# Patient Record
Sex: MALE | Race: WHITE | Employment: OTHER | URBAN - METROPOLITAN AREA
[De-identification: names, ages, dates, MRNs, and addresses within clinical notes are randomized per-mention and may not be internally consistent; named-entity substitution may affect disease eponyms.]

---

## 2017-03-21 ENCOUNTER — APPOINTMENT (OUTPATIENT)
Dept: LAB | Age: 42
End: 2017-03-21
Attending: PHYSICIAN ASSISTANT
Payer: COMMERCIAL

## 2017-03-24 ENCOUNTER — LAB ENCOUNTER (OUTPATIENT)
Dept: LAB | Age: 42
End: 2017-03-24
Attending: PHYSICIAN ASSISTANT
Payer: COMMERCIAL

## 2017-03-24 ENCOUNTER — PRIOR ORIGINAL RECORDS (OUTPATIENT)
Dept: OTHER | Age: 42
End: 2017-03-24

## 2017-03-24 ENCOUNTER — OFFICE VISIT (OUTPATIENT)
Dept: FAMILY MEDICINE CLINIC | Facility: CLINIC | Age: 42
End: 2017-03-24

## 2017-03-24 VITALS
DIASTOLIC BLOOD PRESSURE: 78 MMHG | TEMPERATURE: 98 F | BODY MASS INDEX: 28.35 KG/M2 | HEART RATE: 74 BPM | HEIGHT: 70 IN | WEIGHT: 198 LBS | RESPIRATION RATE: 16 BRPM | SYSTOLIC BLOOD PRESSURE: 120 MMHG

## 2017-03-24 DIAGNOSIS — E78.00 HYPERCHOLESTEREMIA: ICD-10-CM

## 2017-03-24 DIAGNOSIS — E04.1 THYROID NODULE: ICD-10-CM

## 2017-03-24 DIAGNOSIS — Z01.89 ROUTINE LAB DRAW: ICD-10-CM

## 2017-03-24 DIAGNOSIS — E78.00 HYPERCHOLESTEREMIA: Primary | ICD-10-CM

## 2017-03-24 LAB
ALBUMIN SERPL-MCNC: 4 G/DL (ref 3.5–4.8)
ALP LIVER SERPL-CCNC: 64 U/L (ref 45–117)
ALT SERPL-CCNC: 50 U/L (ref 17–63)
AST SERPL-CCNC: 26 U/L (ref 15–41)
BASOPHILS # BLD AUTO: 0.04 X10(3) UL (ref 0–0.1)
BASOPHILS NFR BLD AUTO: 0.6 %
BILIRUB SERPL-MCNC: 0.4 MG/DL (ref 0.1–2)
BILIRUB UR QL STRIP.AUTO: NEGATIVE
BUN BLD-MCNC: 18 MG/DL (ref 8–20)
CALCIUM BLD-MCNC: 9.2 MG/DL (ref 8.3–10.3)
CHLORIDE: 106 MMOL/L (ref 101–111)
CHOLEST SMN-MCNC: 128 MG/DL (ref ?–200)
CLARITY UR REFRACT.AUTO: CLEAR
CO2: 29 MMOL/L (ref 22–32)
COLOR UR AUTO: YELLOW
CREAT BLD-MCNC: 0.97 MG/DL (ref 0.7–1.3)
EOSINOPHIL # BLD AUTO: 0.15 X10(3) UL (ref 0–0.3)
EOSINOPHIL NFR BLD AUTO: 2.4 %
ERYTHROCYTE [DISTWIDTH] IN BLOOD BY AUTOMATED COUNT: 12.9 % (ref 11.5–16)
FREE T4: 0.8 NG/DL (ref 0.9–1.8)
GLUCOSE BLD-MCNC: 78 MG/DL (ref 70–99)
GLUCOSE UR STRIP.AUTO-MCNC: NEGATIVE MG/DL
HCT VFR BLD AUTO: 44.3 % (ref 37–53)
HDLC SERPL-MCNC: 53 MG/DL (ref 45–?)
HDLC SERPL: 2.42 {RATIO} (ref ?–4.97)
HGB BLD-MCNC: 13.9 G/DL (ref 13–17)
IMMATURE GRANULOCYTE COUNT: 0.01 X10(3) UL (ref 0–1)
IMMATURE GRANULOCYTE RATIO %: 0.2 %
KETONES UR STRIP.AUTO-MCNC: NEGATIVE MG/DL
LDLC SERPL CALC-MCNC: 52 MG/DL (ref ?–130)
LEUKOCYTE ESTERASE UR QL STRIP.AUTO: NEGATIVE
LYMPHOCYTES # BLD AUTO: 1.92 X10(3) UL (ref 0.9–4)
LYMPHOCYTES NFR BLD AUTO: 30.5 %
M PROTEIN MFR SERPL ELPH: 7.3 G/DL (ref 6.1–8.3)
MCH RBC QN AUTO: 27.6 PG (ref 27–33.2)
MCHC RBC AUTO-ENTMCNC: 31.4 G/DL (ref 31–37)
MCV RBC AUTO: 88.1 FL (ref 80–99)
MONOCYTES # BLD AUTO: 0.67 X10(3) UL (ref 0.1–0.6)
MONOCYTES NFR BLD AUTO: 10.7 %
NEUTROPHIL ABS PRELIM: 3.5 X10 (3) UL (ref 1.3–6.7)
NEUTROPHILS # BLD AUTO: 3.5 X10(3) UL (ref 1.3–6.7)
NEUTROPHILS NFR BLD AUTO: 55.6 %
NITRITE UR QL STRIP.AUTO: NEGATIVE
NONHDLC SERPL-MCNC: 75 MG/DL (ref ?–130)
PH UR STRIP.AUTO: 7 [PH] (ref 4.5–8)
PLATELET # BLD AUTO: 195 10(3)UL (ref 150–450)
POTASSIUM SERPL-SCNC: 4.1 MMOL/L (ref 3.6–5.1)
PROT UR STRIP.AUTO-MCNC: NEGATIVE MG/DL
RBC # BLD AUTO: 5.03 X10(6)UL (ref 4.3–5.7)
RBC UR QL AUTO: NEGATIVE
RED CELL DISTRIBUTION WIDTH-SD: 41.3 FL (ref 35.1–46.3)
SODIUM SERPL-SCNC: 142 MMOL/L (ref 136–144)
SP GR UR STRIP.AUTO: 1.02 (ref 1–1.03)
TRIGLYCERIDES: 114 MG/DL (ref ?–150)
TSI SER-ACNC: 0.87 MIU/ML (ref 0.35–5.5)
UROBILINOGEN UR STRIP.AUTO-MCNC: <2 MG/DL
VLDL: 23 MG/DL (ref 5–40)
WBC # BLD AUTO: 6.3 X10(3) UL (ref 4–13)

## 2017-03-24 PROCEDURE — 81003 URINALYSIS AUTO W/O SCOPE: CPT

## 2017-03-24 PROCEDURE — 84439 ASSAY OF FREE THYROXINE: CPT

## 2017-03-24 PROCEDURE — 36415 COLL VENOUS BLD VENIPUNCTURE: CPT

## 2017-03-24 PROCEDURE — 80061 LIPID PANEL: CPT

## 2017-03-24 PROCEDURE — 80053 COMPREHEN METABOLIC PANEL: CPT

## 2017-03-24 PROCEDURE — 85025 COMPLETE CBC W/AUTO DIFF WBC: CPT

## 2017-03-24 PROCEDURE — 99396 PREV VISIT EST AGE 40-64: CPT | Performed by: PHYSICIAN ASSISTANT

## 2017-03-24 PROCEDURE — 84443 ASSAY THYROID STIM HORMONE: CPT

## 2017-03-24 RX ORDER — ROSUVASTATIN CALCIUM 10 MG/1
TABLET, COATED ORAL
Qty: 30 TABLET | Refills: 11 | Status: SHIPPED | OUTPATIENT
Start: 2017-03-24 | End: 2018-04-10

## 2017-03-24 NOTE — PROGRESS NOTES
REASON FOR VISIT:    Lynnette Duran is a 42year old male who presents for an [address].     HLD  Pt is doing well on crestor  Pt denies any SOB/CP, muscle aches    Thyroid nodule  Monitoring with annual US    No other complaints or problems 0.99 10/01/2015   CREATSERUM 0.88 09/04/2015     No results found for: PSA    General Health     How would you describe your current health state?: Fair    Type of Diet: Balanced    How do you maintain positive mental well-being?: Visiting Family    How wo adults age 22-65, older adults at increased risk No results found for: HIV    Syphilis Screening Screen if pregnant or high risk No results found for: RPR    Hepatitis C Screening Screen those at high risk plus screen one time for adults born 1946-1 [address] [facility]. Abdominal pain    • Diarrhea    • Fatigue    • Low back sprain    • URI (upper respiratory infection)    • Other and unspecified hyperlipidemia           Past Surgical History    ORAL SURGERY PROCEDURE      Comment wisdom teeth      Family History   Proble PLAN SUMMARY:   Diagnoses and all orders for this visit:  PHQ-2 score 0  CAGE score 0  Pt is up to date on routine screening and health maintenance    Hypercholesteremia  Chronic controlled on crestor  Will continue and check labs  -     Lipid Panel; F

## 2017-03-24 NOTE — PATIENT INSTRUCTIONS
Thank you for choosing Madelyn Dillon PA-C at [place] [address]  To Do: Shilo Naidu  1. Pt to continue medications  2. Get lab work done  3. Order for thyroid US  4.  Follow-up in 1 year, sooner if problems    • Please signup for MY CHART, which i make you healthier and to improve your quality of life.     Referrals, and Radiology Information:    If your insurance requires a referral to a specialist, please allow 5 business days to process your referral request.    If Sary Britt PA-C orders a

## 2017-03-31 ENCOUNTER — HOSPITAL ENCOUNTER (OUTPATIENT)
Dept: ULTRASOUND IMAGING | Age: 42
Discharge: HOME | End: 2017-03-31
Attending: PHYSICIAN ASSISTANT
Payer: COMMERCIAL

## 2017-03-31 DIAGNOSIS — E04.1 THYROID NODULE: ICD-10-CM

## 2017-03-31 PROCEDURE — 76536 US EXAM OF HEAD AND NECK: CPT

## 2017-04-13 ENCOUNTER — OFFICE VISIT (OUTPATIENT)
Dept: FAMILY MEDICINE CLINIC | Facility: CLINIC | Age: 42
End: 2017-04-13

## 2017-04-13 VITALS
WEIGHT: 199 LBS | TEMPERATURE: 98 F | SYSTOLIC BLOOD PRESSURE: 110 MMHG | BODY MASS INDEX: 28.49 KG/M2 | DIASTOLIC BLOOD PRESSURE: 78 MMHG | HEART RATE: 64 BPM | HEIGHT: 70 IN

## 2017-04-13 DIAGNOSIS — H65.01 RIGHT ACUTE SEROUS OTITIS MEDIA, RECURRENCE NOT SPECIFIED: ICD-10-CM

## 2017-04-13 DIAGNOSIS — F41.9 ANXIETY: ICD-10-CM

## 2017-04-13 DIAGNOSIS — R59.9 ENLARGED LYMPH NODES: Primary | ICD-10-CM

## 2017-04-13 DIAGNOSIS — R14.0 ABDOMINAL BLOATING: ICD-10-CM

## 2017-04-13 PROCEDURE — 99214 OFFICE O/P EST MOD 30 MIN: CPT | Performed by: PHYSICIAN ASSISTANT

## 2017-04-13 RX ORDER — IPRATROPIUM BROMIDE 21 UG/1
2 SPRAY, METERED NASAL EVERY 12 HOURS
Qty: 1 BOTTLE | Refills: 0 | Status: SHIPPED | OUTPATIENT
Start: 2017-04-13 | End: 2017-05-11

## 2017-04-13 NOTE — PROGRESS NOTES
[facility] Internal Medicine Progress Note    CC:  Patient presents with:  Test Results  Throat Problem: right side lump/sore. No pain when swallowing, unknown if it hurts as he keeps pressing on his throat. Ear Pain: right inner ear/fluid? Gastrointestinal: Positive for abdominal pain and abdominal distention. Negative for nausea, vomiting, diarrhea, constipation, blood in stool, anal bleeding and rectal pain. Hematological: Positive for adenopathy.    Psychiatric/Behavioral: Negative for month, sooner if problems       Orders Placed This Encounter  Celiac Disease Panel [E]  Amylase [E]  Lipase [E]    Meds & Refills for this Visit:  Signed Prescriptions Disp Refills    Ipratropium Bromide (ATROVENT) 0.03 % Nasal Solution 1 Bottle 0      Sig

## 2017-04-13 NOTE — PATIENT INSTRUCTIONS
Thank you for choosing Yesenia Posey PA-C at [place] [address]  To Do: Radha Naidu  1. Pt to get lab work done and imaging  2. Referral for Rafael Hernandez  3.  Pt to follow-up in 1 month, sooner if problems    • Please signup for MY CHART, which is electro healthier and to improve your quality of life.     Referrals, and Radiology Information:    If your insurance requires a referral to a specialist, please allow 5 business days to process your referral request.    If Lucy Teran PA-C orders a CT or MRI

## 2017-04-17 ENCOUNTER — LAB ENCOUNTER (OUTPATIENT)
Dept: LAB | Age: 42
End: 2017-04-17
Attending: PHYSICIAN ASSISTANT
Payer: COMMERCIAL

## 2017-04-17 DIAGNOSIS — R14.0 ABDOMINAL BLOATING: ICD-10-CM

## 2017-04-17 PROCEDURE — 86255 FLUORESCENT ANTIBODY SCREEN: CPT

## 2017-04-17 PROCEDURE — 36415 COLL VENOUS BLD VENIPUNCTURE: CPT

## 2017-04-17 PROCEDURE — 83690 ASSAY OF LIPASE: CPT

## 2017-04-17 PROCEDURE — 83516 IMMUNOASSAY NONANTIBODY: CPT

## 2017-04-17 PROCEDURE — 82784 ASSAY IGA/IGD/IGG/IGM EACH: CPT

## 2017-04-17 PROCEDURE — 82150 ASSAY OF AMYLASE: CPT

## 2017-04-18 ENCOUNTER — HOSPITAL ENCOUNTER (OUTPATIENT)
Dept: CT IMAGING | Age: 42
Discharge: HOME | End: 2017-04-18
Attending: PHYSICIAN ASSISTANT
Payer: COMMERCIAL

## 2017-04-18 ENCOUNTER — HOSPITAL ENCOUNTER (OUTPATIENT)
Dept: ULTRASOUND IMAGING | Age: 42
Discharge: HOME | End: 2017-04-18
Attending: PHYSICIAN ASSISTANT
Payer: COMMERCIAL

## 2017-04-18 DIAGNOSIS — R14.0 ABDOMINAL BLOATING: ICD-10-CM

## 2017-04-18 DIAGNOSIS — R59.9 ENLARGED LYMPH NODES: ICD-10-CM

## 2017-04-18 PROCEDURE — 70491 CT SOFT TISSUE NECK W/DYE: CPT

## 2017-04-18 PROCEDURE — 76700 US EXAM ABDOM COMPLETE: CPT

## 2017-04-24 ENCOUNTER — PRIOR ORIGINAL RECORDS (OUTPATIENT)
Dept: OTHER | Age: 42
End: 2017-04-24

## 2017-04-24 DIAGNOSIS — R14.0 ABDOMINAL BLOATING: Primary | ICD-10-CM

## 2017-04-24 NOTE — PROGRESS NOTES
Quick Note:    All labs are normal  Pt should follow-up with GI, referral for Dr. Ciara Mendoza  ______

## 2017-04-26 LAB
ALBUMIN: 4 G/DL
ALKALINE PHOSPHATATE(ALK PHOS): 64 IU/L
BILIRUBIN TOTAL: 0.4 MG/DL
BUN: 18 MG/DL
CALCIUM: 9.2 MG/DL
CHLORIDE: 106 MEQ/L
CHOLESTEROL, TOTAL: 128 MG/DL
CREATININE, SERUM: 0.97 MG/DL
FREE T4: 0.8 MG/DL
GLUCOSE: 78 MG/DL
HDL CHOLESTEROL: 53 MG/DL
HEMATOCRIT: 44.3 %
HEMOGLOBIN: 13.9 G/DL
LDL CHOLESTEROL: 52 MG/DL
PLATELETS: 195 K/UL
POTASSIUM, SERUM: 4.1 MEQ/L
PROTEIN, TOTAL: 7.3 G/DL
RED BLOOD COUNT: 5.03 X 10-6/U
SGOT (AST): 26 IU/L
SGPT (ALT): 50 IU/L
SODIUM: 142 MEQ/L
THYROID STIMULATING HORMONE: 0.87 MLU/L
TRIGLYCERIDES: 114 MG/DL
WHITE BLOOD COUNT: 6.3 X 10-3/U

## 2017-05-11 NOTE — TELEPHONE ENCOUNTER
Requesting Ipratropium Nasal Spray  LOV: 4/13/17  RTC: one month   Last Labs: n/a  Filled: 4/13/17 #1 bottle with 0 refills    No future appointments.     Non protocol med pended for approval

## 2017-05-15 RX ORDER — IPRATROPIUM BROMIDE 21 UG/1
SPRAY, METERED NASAL
Qty: 1 BOTTLE | Refills: 2 | Status: SHIPPED | OUTPATIENT
Start: 2017-05-15 | End: 2017-08-21 | Stop reason: ALTCHOICE

## 2017-08-21 ENCOUNTER — OFFICE VISIT (OUTPATIENT)
Dept: FAMILY MEDICINE CLINIC | Facility: CLINIC | Age: 42
End: 2017-08-21

## 2017-08-21 VITALS
WEIGHT: 199 LBS | OXYGEN SATURATION: 100 % | HEIGHT: 70 IN | RESPIRATION RATE: 20 BRPM | HEART RATE: 87 BPM | BODY MASS INDEX: 28.49 KG/M2 | TEMPERATURE: 98 F | SYSTOLIC BLOOD PRESSURE: 124 MMHG | DIASTOLIC BLOOD PRESSURE: 70 MMHG

## 2017-08-21 DIAGNOSIS — R30.0 BURNING WITH URINATION: Primary | ICD-10-CM

## 2017-08-21 LAB
APPEARANCE: CLEAR
BILIRUBIN: NEGATIVE
GLUCOSE (URINE DIPSTICK): NEGATIVE MG/DL
KETONES (URINE DIPSTICK): NEGATIVE MG/DL
LEUKOCYTES: NEGATIVE
MULTISTIX LOT#: NORMAL NUMERIC
NITRITE, URINE: NEGATIVE
OCCULT BLOOD: NEGATIVE
PH, URINE: 6 (ref 4.5–8)
PROTEIN (URINE DIPSTICK): NEGATIVE MG/DL
SPECIFIC GRAVITY: 1.02 (ref 1–1.03)
URINE-COLOR: YELLOW
UROBILINOGEN,SEMI-QN: 1 MG/DL (ref 0–1.9)

## 2017-08-21 PROCEDURE — 87491 CHLMYD TRACH DNA AMP PROBE: CPT | Performed by: NURSE PRACTITIONER

## 2017-08-21 PROCEDURE — 87086 URINE CULTURE/COLONY COUNT: CPT | Performed by: NURSE PRACTITIONER

## 2017-08-21 PROCEDURE — 87591 N.GONORRHOEAE DNA AMP PROB: CPT | Performed by: NURSE PRACTITIONER

## 2017-08-21 PROCEDURE — 99213 OFFICE O/P EST LOW 20 MIN: CPT | Performed by: NURSE PRACTITIONER

## 2017-08-21 PROCEDURE — 81003 URINALYSIS AUTO W/O SCOPE: CPT | Performed by: NURSE PRACTITIONER

## 2017-08-21 RX ORDER — CIPROFLOXACIN 500 MG/1
500 TABLET, FILM COATED ORAL 2 TIMES DAILY
Qty: 28 TABLET | Refills: 0 | Status: SHIPPED | OUTPATIENT
Start: 2017-08-21 | End: 2017-09-04

## 2017-08-21 NOTE — PATIENT INSTRUCTIONS
Anatomy of the Male Urinary Tract  Your urinary tract helps to get rid of your body’s liquid waste. The kidneys constantly filter the blood to collect unneeded chemicals and water, making urine. Urine travels through the ureters to the bladder.  The bladd · Prostatitis: This is an inflammation or infection of the prostate. You may have an urgent or frequent need to urinate, fever, or burning when you urinate. Or you may have a tender prostate, or a vague feeling of pressure.  Prostatitis is treated with a ra © 9438-0452 [address], [address] [place]. All rights reserved. This information is not intended as a substitute for professional medical care. Always follow your healthcare professional's instructions.

## 2017-08-21 NOTE — PROGRESS NOTES
CHIEF COMPLAINT:   Patient presents with:  Burning On Urination: 1 week on/off      HPI:   Neri Morrison is a 42year old male who presents with symptoms of UTI. Complaining of dysuria for last 7-10 days.  Symptoms have been intermittent since onset, oc GENERAL: well developed, well nourished,in no apparent distress  CARDIO: RRR, no murmurs  LUNGS: clear to ausculation bilaterally, no wheezing or rhonchi  GI: BS present x 4. No hepatosplenomegaly. : no suprapubic tenderness.  No bladder distention, or Your urinary tract helps to get rid of your body’s liquid waste. The kidneys constantly filter the blood to collect unneeded chemicals and water, making urine. Urine travels through the ureters to the bladder.  The bladder fills with urine, holding it until · Prostatitis: This is an inflammation or infection of the prostate. You may have an urgent or frequent need to urinate, fever, or burning when you urinate. Or you may have a tender prostate, or a vague feeling of pressure.  Prostatitis is treated with a ra © 6981-6787 [address], [address] [place]. All rights reserved. This information is not intended as a substitute for professional medical care. Always follow your healthcare professional's instructions.               Kermit Goldberg

## 2017-08-22 LAB
C TRACH DNA SPEC QL NAA+PROBE: NEGATIVE
N GONORRHOEA DNA SPEC QL NAA+PROBE: NEGATIVE

## 2017-11-10 ENCOUNTER — OFFICE VISIT (OUTPATIENT)
Dept: FAMILY MEDICINE CLINIC | Facility: CLINIC | Age: 42
End: 2017-11-10

## 2017-11-10 VITALS
RESPIRATION RATE: 16 BRPM | HEART RATE: 88 BPM | TEMPERATURE: 98 F | HEIGHT: 70 IN | DIASTOLIC BLOOD PRESSURE: 82 MMHG | SYSTOLIC BLOOD PRESSURE: 124 MMHG | BODY MASS INDEX: 28.35 KG/M2 | WEIGHT: 198 LBS

## 2017-11-10 DIAGNOSIS — J01.00 ACUTE MAXILLARY SINUSITIS, RECURRENCE NOT SPECIFIED: Primary | ICD-10-CM

## 2017-11-10 PROCEDURE — 99213 OFFICE O/P EST LOW 20 MIN: CPT | Performed by: PHYSICIAN ASSISTANT

## 2017-11-10 RX ORDER — IPRATROPIUM BROMIDE 21 UG/1
2 SPRAY, METERED NASAL EVERY 12 HOURS
Qty: 1 BOTTLE | Refills: 0 | Status: SHIPPED | OUTPATIENT
Start: 2017-11-10 | End: 2018-01-30 | Stop reason: ALTCHOICE

## 2017-11-10 RX ORDER — METHYLPREDNISOLONE 4 MG/1
TABLET ORAL
Qty: 1 KIT | Refills: 0 | Status: SHIPPED | OUTPATIENT
Start: 2017-11-10 | End: 2018-01-30 | Stop reason: ALTCHOICE

## 2017-11-10 RX ORDER — AMOXICILLIN AND CLAVULANATE POTASSIUM 875; 125 MG/1; MG/1
1 TABLET, FILM COATED ORAL 2 TIMES DAILY
Qty: 20 TABLET | Refills: 0 | Status: SHIPPED | OUTPATIENT
Start: 2017-11-10 | End: 2017-11-20

## 2017-11-10 NOTE — PATIENT INSTRUCTIONS
Thank you for choosing Latasha Balbuena PA-C at [place] [address]  To Do: Sena Naidu  1. Pt to begin medications as prescribed  2.  If symptoms persist or increase pt to call office  Effective 6/19/17 until November 2017  Due to [employer] potential risk of harm or side effects or medication interactions.  It is your duty and for your safety to discuss with the pharmacist and our office with questions, and to notify us and stop treatment if problems arise, but know that our intention is that

## 2017-11-10 NOTE — PROGRESS NOTES
[facility] Internal Medicine Progress Note    CC:  Patient presents with:  Nasal Congestion: x 3 days  Headache  Fever  Nausea      HPI:   HPI  Nasal congestion, headache, fever x 3 days  A couple of days ago symptoms started  Sinus pressure  + n exudate. + serous fluid behind TMs bilaterally  + clear PND   Eyes: EOM are normal. Pupils are equal, round, and reactive to light. Neck: Normal range of motion. Neck supple. No thyromegaly present.    Cardiovascular: Normal rate, regular rhythm and nor

## 2018-01-30 ENCOUNTER — LAB ENCOUNTER (OUTPATIENT)
Dept: LAB | Age: 43
End: 2018-01-30
Attending: FAMILY MEDICINE
Payer: COMMERCIAL

## 2018-01-30 ENCOUNTER — PRIOR ORIGINAL RECORDS (OUTPATIENT)
Dept: OTHER | Age: 43
End: 2018-01-30

## 2018-01-30 ENCOUNTER — OFFICE VISIT (OUTPATIENT)
Dept: FAMILY MEDICINE CLINIC | Facility: CLINIC | Age: 43
End: 2018-01-30

## 2018-01-30 VITALS
HEART RATE: 70 BPM | RESPIRATION RATE: 16 BRPM | TEMPERATURE: 98 F | DIASTOLIC BLOOD PRESSURE: 76 MMHG | WEIGHT: 204 LBS | BODY MASS INDEX: 29.2 KG/M2 | HEIGHT: 70 IN | SYSTOLIC BLOOD PRESSURE: 118 MMHG

## 2018-01-30 DIAGNOSIS — E55.9 VITAMIN D DEFICIENCY: ICD-10-CM

## 2018-01-30 DIAGNOSIS — Z82.49 FAMILY HISTORY OF PREMATURE CAD: ICD-10-CM

## 2018-01-30 DIAGNOSIS — E78.00 HYPERCHOLESTEREMIA: ICD-10-CM

## 2018-01-30 DIAGNOSIS — E78.00 HYPERCHOLESTEREMIA: Primary | ICD-10-CM

## 2018-01-30 LAB
25-HYDROXYVITAMIN D (TOTAL): 35.6 NG/ML (ref 30–100)
ALBUMIN SERPL-MCNC: 4.4 G/DL (ref 3.5–4.8)
ALP LIVER SERPL-CCNC: 65 U/L (ref 45–117)
ALT SERPL-CCNC: 54 U/L (ref 17–63)
AST SERPL-CCNC: 26 U/L (ref 15–41)
BASOPHILS # BLD AUTO: 0.03 X10(3) UL (ref 0–0.1)
BASOPHILS NFR BLD AUTO: 0.4 %
BILIRUB SERPL-MCNC: 0.4 MG/DL (ref 0.1–2)
BUN BLD-MCNC: 18 MG/DL (ref 8–20)
CALCIUM BLD-MCNC: 9.3 MG/DL (ref 8.3–10.3)
CHLORIDE: 107 MMOL/L (ref 101–111)
CHOLEST SMN-MCNC: 128 MG/DL (ref ?–200)
CO2: 28 MMOL/L (ref 22–32)
CREAT BLD-MCNC: 1.06 MG/DL (ref 0.7–1.3)
EOSINOPHIL # BLD AUTO: 0.16 X10(3) UL (ref 0–0.3)
EOSINOPHIL NFR BLD AUTO: 2.3 %
ERYTHROCYTE [DISTWIDTH] IN BLOOD BY AUTOMATED COUNT: 12.8 % (ref 11.5–16)
GLUCOSE BLD-MCNC: 89 MG/DL (ref 70–99)
HCT VFR BLD AUTO: 44.5 % (ref 37–53)
HDLC SERPL-MCNC: 56 MG/DL (ref 45–?)
HDLC SERPL: 2.29 {RATIO} (ref ?–4.97)
HGB BLD-MCNC: 14 G/DL (ref 13–17)
IMMATURE GRANULOCYTE COUNT: 0.03 X10(3) UL (ref 0–1)
IMMATURE GRANULOCYTE RATIO %: 0.4 %
LDLC SERPL CALC-MCNC: 48 MG/DL (ref ?–130)
LYMPHOCYTES # BLD AUTO: 2.03 X10(3) UL (ref 0.9–4)
LYMPHOCYTES NFR BLD AUTO: 29.4 %
M PROTEIN MFR SERPL ELPH: 8 G/DL (ref 6.1–8.3)
MCH RBC QN AUTO: 27.1 PG (ref 27–33.2)
MCHC RBC AUTO-ENTMCNC: 31.5 G/DL (ref 31–37)
MCV RBC AUTO: 86.2 FL (ref 80–99)
MONOCYTES # BLD AUTO: 0.74 X10(3) UL (ref 0.1–0.6)
MONOCYTES NFR BLD AUTO: 10.7 %
NEUTROPHIL ABS PRELIM: 3.92 X10 (3) UL (ref 1.3–6.7)
NEUTROPHILS # BLD AUTO: 3.92 X10(3) UL (ref 1.3–6.7)
NEUTROPHILS NFR BLD AUTO: 56.8 %
NONHDLC SERPL-MCNC: 72 MG/DL (ref ?–130)
PLATELET # BLD AUTO: 214 10(3)UL (ref 150–450)
POTASSIUM SERPL-SCNC: 4.4 MMOL/L (ref 3.6–5.1)
RBC # BLD AUTO: 5.16 X10(6)UL (ref 4.3–5.7)
RED CELL DISTRIBUTION WIDTH-SD: 39.8 FL (ref 35.1–46.3)
SODIUM SERPL-SCNC: 140 MMOL/L (ref 136–144)
TRIGL SERPL-MCNC: 122 MG/DL (ref ?–150)
VLDLC SERPL CALC-MCNC: 24 MG/DL (ref 5–40)
WBC # BLD AUTO: 6.9 X10(3) UL (ref 4–13)

## 2018-01-30 PROCEDURE — 82306 VITAMIN D 25 HYDROXY: CPT

## 2018-01-30 PROCEDURE — 85025 COMPLETE CBC W/AUTO DIFF WBC: CPT

## 2018-01-30 PROCEDURE — 99203 OFFICE O/P NEW LOW 30 MIN: CPT | Performed by: FAMILY MEDICINE

## 2018-01-30 PROCEDURE — 80053 COMPREHEN METABOLIC PANEL: CPT

## 2018-01-30 PROCEDURE — 36415 COLL VENOUS BLD VENIPUNCTURE: CPT

## 2018-01-30 PROCEDURE — 80061 LIPID PANEL: CPT

## 2018-01-30 NOTE — PROGRESS NOTES
HPI:    Patient ID: Emi Cassidy is a 36year old male. HPI  Mr. Laura Higgins is a pleasant 35 y/o M with history of dyslipidemia and vitamin D deficiency here to establish care with me. He has been taking Crestor 10 mg daily with no side effects.   He e affect. Vitals reviewed. ASSESSMENT/PLAN:   Hypercholesteremia  (primary encounter diagnosis)  Vitamin d deficiency  Family history of premature cad  1.  Hyperlipidemia- stable; continue with current lifestyle regimen and medications; check ro

## 2018-01-30 NOTE — PATIENT INSTRUCTIONS
Thank you for choosing Miguel Angel Walker MD at [place] [address]  To Do: Sunny Naidu  1. Please take meds as ordered  Effective 6/19/17 until November 2017  Due to [employer] is being moved.   It is inside the [place] [address] your duty and for your safety to discuss with the pharmacist and our office with questions, and to notify us and stop treatment if problems arise, but know that our intention is that the benefits outweigh those potential risks and we strive to make you hea

## 2018-03-23 ENCOUNTER — PRIOR ORIGINAL RECORDS (OUTPATIENT)
Dept: OTHER | Age: 43
End: 2018-03-23

## 2018-03-23 ENCOUNTER — HOSPITAL ENCOUNTER (OUTPATIENT)
Dept: CT IMAGING | Age: 43
Discharge: HOME | End: 2018-03-23
Attending: FAMILY MEDICINE

## 2018-03-23 DIAGNOSIS — Z82.49 FAMILY HISTORY OF PREMATURE CAD: ICD-10-CM

## 2018-04-06 ENCOUNTER — HOSPITAL ENCOUNTER (OUTPATIENT)
Dept: CT IMAGING | Age: 43
Discharge: HOME | End: 2018-04-06
Attending: FAMILY MEDICINE
Payer: COMMERCIAL

## 2018-04-06 DIAGNOSIS — E27.9 LESION OF ADRENAL GLAND (HCC): ICD-10-CM

## 2018-04-06 PROCEDURE — 74150 CT ABDOMEN W/O CONTRAST: CPT | Performed by: FAMILY MEDICINE

## 2018-04-11 RX ORDER — ROSUVASTATIN CALCIUM 10 MG/1
TABLET, COATED ORAL
Qty: 30 TABLET | Refills: 2 | Status: SHIPPED | OUTPATIENT
Start: 2018-04-11 | End: 2018-07-18

## 2018-04-23 ENCOUNTER — MYAURORA ACCOUNT LINK (OUTPATIENT)
Dept: OTHER | Age: 43
End: 2018-04-23

## 2018-04-23 ENCOUNTER — PRIOR ORIGINAL RECORDS (OUTPATIENT)
Dept: OTHER | Age: 43
End: 2018-04-23

## 2018-04-23 LAB — UFCT: 15.74 CA SCORE

## 2018-04-26 LAB
ALBUMIN: 4.4 G/DL
ALKALINE PHOSPHATATE(ALK PHOS): 65 IU/L
BILIRUBIN TOTAL: 0.4 MG/DL
BUN: 18 MG/DL
CALCIUM: 9.3 MG/DL
CHLORIDE: 107 MEQ/L
CHOLESTEROL, TOTAL: 128 MG/DL
CREATININE, SERUM: 1.06 MG/DL
GLUCOSE: 89 MG/DL
HDL CHOLESTEROL: 56 MG/DL
LDL CHOLESTEROL: 48 MG/DL
POTASSIUM, SERUM: 4.4 MEQ/L
PROTEIN, TOTAL: 8 G/DL
SGOT (AST): 26 IU/L
SGPT (ALT): 54 IU/L
SODIUM: 140 MEQ/L
TRIGLYCERIDES: 122 MG/DL
VITAMIN D 25-OH: 35.6 NG/ML

## 2018-07-18 RX ORDER — ROSUVASTATIN CALCIUM 10 MG/1
TABLET, COATED ORAL
Qty: 30 TABLET | Refills: 5 | Status: SHIPPED | OUTPATIENT
Start: 2018-07-18 | End: 2019-01-23

## 2018-07-18 NOTE — TELEPHONE ENCOUNTER
Requesting Rosuvastatin  LOV: 1/30/18  RTC: one year  Last Relevant Labs: 1/2018  Filled: 4/11/18 #30 with 2 refills    No future appointments.     Cholesterol Medication Protocol Passed7/18 9:47 AM

## 2018-09-11 ENCOUNTER — OFFICE VISIT (OUTPATIENT)
Dept: FAMILY MEDICINE CLINIC | Facility: CLINIC | Age: 43
End: 2018-09-11
Payer: COMMERCIAL

## 2018-09-11 ENCOUNTER — LAB ENCOUNTER (OUTPATIENT)
Dept: LAB | Age: 43
End: 2018-09-11
Attending: FAMILY MEDICINE
Payer: COMMERCIAL

## 2018-09-11 VITALS
TEMPERATURE: 99 F | HEIGHT: 70 IN | WEIGHT: 193 LBS | BODY MASS INDEX: 27.63 KG/M2 | RESPIRATION RATE: 16 BRPM | DIASTOLIC BLOOD PRESSURE: 62 MMHG | SYSTOLIC BLOOD PRESSURE: 110 MMHG | HEART RATE: 64 BPM

## 2018-09-11 DIAGNOSIS — Z13.29 SCREENING FOR ENDOCRINE, METABOLIC AND IMMUNITY DISORDER: ICD-10-CM

## 2018-09-11 DIAGNOSIS — E78.00 HYPERCHOLESTEREMIA: Primary | ICD-10-CM

## 2018-09-11 DIAGNOSIS — Z13.0 SCREENING FOR ENDOCRINE, METABOLIC AND IMMUNITY DISORDER: ICD-10-CM

## 2018-09-11 DIAGNOSIS — Z13.228 SCREENING FOR ENDOCRINE, METABOLIC AND IMMUNITY DISORDER: ICD-10-CM

## 2018-09-11 DIAGNOSIS — E78.00 HYPERCHOLESTEREMIA: ICD-10-CM

## 2018-09-11 LAB
ALBUMIN SERPL-MCNC: 4.3 G/DL (ref 3.5–4.8)
ALBUMIN/GLOB SERPL: 1.2 {RATIO} (ref 1–2)
ALP LIVER SERPL-CCNC: 62 U/L (ref 45–117)
ALT SERPL-CCNC: 47 U/L (ref 17–63)
ANION GAP SERPL CALC-SCNC: 5 MMOL/L (ref 0–18)
AST SERPL-CCNC: 25 U/L (ref 15–41)
BASOPHILS # BLD AUTO: 0.04 X10(3) UL (ref 0–0.1)
BASOPHILS NFR BLD AUTO: 0.6 %
BILIRUB SERPL-MCNC: 0.6 MG/DL (ref 0.1–2)
BUN BLD-MCNC: 19 MG/DL (ref 8–20)
BUN/CREAT SERPL: 18.6 (ref 10–20)
CALCIUM BLD-MCNC: 9 MG/DL (ref 8.3–10.3)
CHLORIDE SERPL-SCNC: 108 MMOL/L (ref 101–111)
CHOLEST SMN-MCNC: 130 MG/DL (ref ?–200)
CO2 SERPL-SCNC: 29 MMOL/L (ref 22–32)
CREAT BLD-MCNC: 1.02 MG/DL (ref 0.7–1.3)
EOSINOPHIL # BLD AUTO: 0.09 X10(3) UL (ref 0–0.3)
EOSINOPHIL NFR BLD AUTO: 1.4 %
ERYTHROCYTE [DISTWIDTH] IN BLOOD BY AUTOMATED COUNT: 12.8 % (ref 11.5–16)
GLOBULIN PLAS-MCNC: 3.5 G/DL (ref 2.5–4)
GLUCOSE BLD-MCNC: 83 MG/DL (ref 70–99)
HAV AB SERPL IA-ACNC: 317 PG/ML (ref 193–986)
HCT VFR BLD AUTO: 44.7 % (ref 37–53)
HDLC SERPL-MCNC: 54 MG/DL (ref 40–59)
HGB BLD-MCNC: 13.8 G/DL (ref 13–17)
IMMATURE GRANULOCYTE COUNT: 0.03 X10(3) UL (ref 0–1)
IMMATURE GRANULOCYTE RATIO %: 0.5 %
LDLC SERPL CALC-MCNC: 54 MG/DL (ref ?–100)
LYMPHOCYTES # BLD AUTO: 1.81 X10(3) UL (ref 0.9–4)
LYMPHOCYTES NFR BLD AUTO: 29.1 %
M PROTEIN MFR SERPL ELPH: 7.8 G/DL (ref 6.1–8.3)
MCH RBC QN AUTO: 27.5 PG (ref 27–33.2)
MCHC RBC AUTO-ENTMCNC: 30.9 G/DL (ref 31–37)
MCV RBC AUTO: 89.2 FL (ref 80–99)
MONOCYTES # BLD AUTO: 0.57 X10(3) UL (ref 0.1–1)
MONOCYTES NFR BLD AUTO: 9.2 %
NEUTROPHIL ABS PRELIM: 3.68 X10 (3) UL (ref 1.3–6.7)
NEUTROPHILS # BLD AUTO: 3.68 X10(3) UL (ref 1.3–6.7)
NEUTROPHILS NFR BLD AUTO: 59.2 %
NONHDLC SERPL-MCNC: 76 MG/DL (ref ?–130)
OSMOLALITY SERPL CALC.SUM OF ELEC: 295 MOSM/KG (ref 275–295)
PLATELET # BLD AUTO: 208 10(3)UL (ref 150–450)
POTASSIUM SERPL-SCNC: 4.4 MMOL/L (ref 3.6–5.1)
RBC # BLD AUTO: 5.01 X10(6)UL (ref 4.3–5.7)
RED CELL DISTRIBUTION WIDTH-SD: 41.5 FL (ref 35.1–46.3)
SODIUM SERPL-SCNC: 142 MMOL/L (ref 136–144)
TRIGL SERPL-MCNC: 111 MG/DL (ref 30–149)
TSI SER-ACNC: 0.86 MIU/ML (ref 0.35–5.5)
VIT D+METAB SERPL-MCNC: 29.6 NG/ML (ref 30–100)
VLDLC SERPL CALC-MCNC: 22 MG/DL (ref 0–30)
WBC # BLD AUTO: 6.2 X10(3) UL (ref 4–13)

## 2018-09-11 PROCEDURE — 84443 ASSAY THYROID STIM HORMONE: CPT

## 2018-09-11 PROCEDURE — 82607 VITAMIN B-12: CPT

## 2018-09-11 PROCEDURE — 85025 COMPLETE CBC W/AUTO DIFF WBC: CPT

## 2018-09-11 PROCEDURE — 99396 PREV VISIT EST AGE 40-64: CPT | Performed by: FAMILY MEDICINE

## 2018-09-11 PROCEDURE — 36415 COLL VENOUS BLD VENIPUNCTURE: CPT

## 2018-09-11 PROCEDURE — 80061 LIPID PANEL: CPT

## 2018-09-11 PROCEDURE — 80053 COMPREHEN METABOLIC PANEL: CPT

## 2018-09-11 PROCEDURE — 82306 VITAMIN D 25 HYDROXY: CPT

## 2018-09-11 NOTE — PATIENT INSTRUCTIONS
Thank you for choosing Elsy Blake MD at [place] [address]  To Do: Merari Naidu  1. Please see age appropriate health prevention below      [facility] is located in [address]. Monday, Tuesday & Friday – 8 a.m. to 4 p.m.   Wednesday, [place] that the benefits outweigh those potential risks and we strive to make you healthier and to improve your quality of life.     Referrals, and Radiology Information:    If your insurance requires a referral to a specialist, please allow 5 business days to pro exams   Blood pressure All men in this age group Yearly checkup if your blood pressure reading is normal  Normal blood pressure is less than 120/80 mm Hg  If your blood pressure is higher than normal, follow the advice of your healthcare provider      Depr dose should be given at least 2 months after the second dose and at least 4 months after the first dose   Haemophilus influenzae Type B (HIB) Men at increased risk for infection – talk with your healthcare provider 1 to 3 doses   Influenza (flu) All men in

## 2018-09-11 NOTE — PROGRESS NOTES
Wellness Exam    REASON FOR VISIT:    Eric Rodriguez is a 36year old male who presents for an [address].     Current Complaints: Mr. Adina Brandt is a pleasant 37 y/o M here for his wellness  Flu Shot: see immunization record  [address]  There are no preventive care reminders to display for this patient. Flex Sigmoidoscopy Screen  Every 5 years No results found for this or any previous visit.     Fecal Occult Blood  Annually No results found for: FOB, OCCULTSTOOL    Obesity Screening Scr URI (upper respiratory infection)       Past Surgical History:  No date: ORAL SURGERY PROCEDURE      Comment:  wisdom teeth   Family History   Problem Relation Age of Onset   • Hypertension Father       SOCIAL HISTORY:   Social History    Tobacco Use no wheezes. He has no rales. Abdominal: Soft. Bowel sounds are normal. There is no tenderness. Musculoskeletal: Normal range of motion. He exhibits no edema. Lymphadenopathy:     He has no cervical adenopathy.    Neurological: He is alert and oriented 01/30/2019  Patient/Caregiver Education:  Patient/Caregiver Education: There are no barriers to learning. Medical education done. Outcome: Patient verbalizes understanding.     Educated by: MD   The patient indicates understanding of these issues and agree

## 2018-09-12 ENCOUNTER — OFFICE VISIT (OUTPATIENT)
Dept: FAMILY MEDICINE CLINIC | Facility: CLINIC | Age: 43
End: 2018-09-12
Payer: COMMERCIAL

## 2018-09-12 VITALS
WEIGHT: 193 LBS | HEIGHT: 70 IN | RESPIRATION RATE: 14 BRPM | TEMPERATURE: 99 F | HEART RATE: 70 BPM | DIASTOLIC BLOOD PRESSURE: 70 MMHG | SYSTOLIC BLOOD PRESSURE: 110 MMHG | BODY MASS INDEX: 27.63 KG/M2

## 2018-09-12 DIAGNOSIS — R10.32 LLQ PAIN: Primary | ICD-10-CM

## 2018-09-12 PROCEDURE — 99213 OFFICE O/P EST LOW 20 MIN: CPT | Performed by: FAMILY MEDICINE

## 2018-09-12 NOTE — PATIENT INSTRUCTIONS
Thank you for choosing Leslie Abraham MD at [place] [address]  To Do: Precious Naidu  1. Please set up appointment with gastroeneterologist  [facility] is located in [address]. Monday, Tuesday & Friday – 8 a.m. to 4 p.m.   Wednesday, Thursda the benefits outweigh those potential risks and we strive to make you healthier and to improve your quality of life.     Referrals, and Radiology Information:    If your insurance requires a referral to a specialist, please allow 5 business days to process

## 2018-09-12 NOTE — PROGRESS NOTES
HPI:    Patient ID: Melody Elkins is a 36year old male. HPI  Mr. Karis Martines is a pleasant 37 y/o M with a history of dyslipidemia who actually saw her yesterday for his wellness exam but apparently has been having left lower quadrant pain for the past HENT:   Mouth/Throat: Oropharynx is clear and moist. No oropharyngeal exudate. Eyes: Conjunctivae are normal. No scleral icterus. Neck: Neck supple. No thyromegaly present. Cardiovascular: Normal rate, regular rhythm and normal heart sounds.    No m

## 2018-09-26 PROBLEM — K92.1 MELENA: Status: ACTIVE | Noted: 2018-09-26

## 2018-09-26 PROBLEM — D12.2 BENIGN NEOPLASM OF ASCENDING COLON: Status: ACTIVE | Noted: 2018-09-26

## 2018-09-26 PROBLEM — R19.4 CHANGE IN BOWEL HABITS: Status: ACTIVE | Noted: 2018-09-26

## 2018-11-14 ENCOUNTER — OFFICE VISIT (OUTPATIENT)
Dept: FAMILY MEDICINE CLINIC | Facility: CLINIC | Age: 43
End: 2018-11-14
Payer: COMMERCIAL

## 2018-11-14 VITALS
WEIGHT: 199.38 LBS | OXYGEN SATURATION: 98 % | SYSTOLIC BLOOD PRESSURE: 130 MMHG | HEART RATE: 90 BPM | DIASTOLIC BLOOD PRESSURE: 70 MMHG | HEIGHT: 70 IN | BODY MASS INDEX: 28.54 KG/M2 | TEMPERATURE: 99 F

## 2018-11-14 DIAGNOSIS — J00 ACUTE NASOPHARYNGITIS: Primary | ICD-10-CM

## 2018-11-14 PROCEDURE — 99213 OFFICE O/P EST LOW 20 MIN: CPT | Performed by: NURSE PRACTITIONER

## 2018-11-14 RX ORDER — FLUTICASONE PROPIONATE 50 MCG
1 SPRAY, SUSPENSION (ML) NASAL 2 TIMES DAILY
Qty: 1 BOTTLE | Refills: 1 | Status: SHIPPED | OUTPATIENT
Start: 2018-11-14 | End: 2018-12-14

## 2018-11-14 NOTE — PROGRESS NOTES
HPI:   Denise Glasgow is a 36year old male who presents with ill symptoms for  3-4  days.  Patient reports sore throat only at the beginning of sx's, congestion, cough with yellow colored sputum, cough is not keeping pt up at night, OTC cold meds have bee congested, as above in HPI  LUNGS: denies shortness of breath with exertion, as above  CARDIOVASCULAR: denies chest pain on exertion  GI: no nausea or abdominal pain, appetite normal  NEURO: admits to headaches at beginning of illness.     EXAM:   /70 2-3 drops in each nostril 2-4 times daily  · Steamy showers or inhalation of steam  · Warm fluids such as tea and chicken soup can increase the rate of mucous flow  · Salt water gargle for sore throat  · Hard candy or lozenge for sore throat and cough  · T

## 2018-11-14 NOTE — PATIENT INSTRUCTIONS
· Please start Flonase 1 spray each nostril twice daily before brushing teeth. Use for at least one to two weeks. May stop if improving. Restart if symptoms return.   · Common colds are caused by viruses which are not eradicated with antibiotics  · Cold rem

## 2019-01-24 RX ORDER — ROSUVASTATIN CALCIUM 10 MG/1
TABLET, COATED ORAL
Qty: 30 TABLET | Refills: 4 | Status: SHIPPED | OUTPATIENT
Start: 2019-01-24 | End: 2019-06-29

## 2019-01-24 NOTE — TELEPHONE ENCOUNTER
Cholesterol Medication Protocol Passed1/23 3:22 PM     Requesting ROSUVASTATIN CALCIUM 10 MG  LOV: 9/12/18  RTC: none specified   Last Labs: 9/11/18  Filled: 7/18/18#30 with 5 refills    Future Appointments   Date Time Provider Tan Gusman   3/12/201

## 2019-02-09 ENCOUNTER — OFFICE VISIT (OUTPATIENT)
Dept: FAMILY MEDICINE CLINIC | Facility: CLINIC | Age: 44
End: 2019-02-09
Payer: COMMERCIAL

## 2019-02-09 VITALS
SYSTOLIC BLOOD PRESSURE: 118 MMHG | WEIGHT: 195 LBS | HEART RATE: 96 BPM | OXYGEN SATURATION: 99 % | DIASTOLIC BLOOD PRESSURE: 70 MMHG | TEMPERATURE: 100 F | HEIGHT: 70 IN | RESPIRATION RATE: 20 BRPM | BODY MASS INDEX: 27.92 KG/M2

## 2019-02-09 DIAGNOSIS — Z20.828 EXPOSURE TO INFLUENZA: ICD-10-CM

## 2019-02-09 DIAGNOSIS — R68.89 INFLUENZA-LIKE SYMPTOMS: Primary | ICD-10-CM

## 2019-02-09 LAB
POCT INFLUENZA A: NEGATIVE
POCT INFLUENZA B: NEGATIVE

## 2019-02-09 PROCEDURE — 87502 INFLUENZA DNA AMP PROBE: CPT | Performed by: NURSE PRACTITIONER

## 2019-02-09 PROCEDURE — 99213 OFFICE O/P EST LOW 20 MIN: CPT | Performed by: NURSE PRACTITIONER

## 2019-02-09 RX ORDER — OSELTAMIVIR PHOSPHATE 75 MG/1
75 CAPSULE ORAL 2 TIMES DAILY
Qty: 10 CAPSULE | Refills: 0 | Status: SHIPPED | OUTPATIENT
Start: 2019-02-09 | End: 2019-02-14

## 2019-02-09 NOTE — PROGRESS NOTES
CHIEF COMPLAINT:   Patient presents with:  Flu: bodyache, chills, fever (100.7) s/s for 2 days. No OTC meds taken      HPI:   Radha Samayoa is a 39year old male who presents for upper respiratory symptoms for  2 days.  Patient reports sore throat only a CARDIOVASCULAR: denies chest pain or palpitations   GI: denies N/V/C or abdominal pain      EXAM:   /70   Pulse 96   Temp 99.8 °F (37.7 °C) (Oral)   Resp 20   Ht 70\"   Wt 195 lb   SpO2 99%   BMI 27.98 kg/m²   GENERAL: well developed, well nourished, Influenza is also called the flu. It is a viral illness that affects the air passages of your lungs. It is different from the common cold. The flu can easily be passed from one to person to another.  It may be spread through the air by coughing and sneezing If you are age 72 or older, talk with your provider about getting a pneumococcal vaccine every 5 years. You should also get this vaccine if you have chronic asthma or COPD. All adults should get a flu vaccine every fall. Ask your provider about this.   When

## 2019-02-28 VITALS
BODY MASS INDEX: 29.33 KG/M2 | HEART RATE: 68 BPM | DIASTOLIC BLOOD PRESSURE: 60 MMHG | HEIGHT: 69 IN | SYSTOLIC BLOOD PRESSURE: 102 MMHG | WEIGHT: 198 LBS

## 2019-03-01 VITALS
WEIGHT: 198 LBS | DIASTOLIC BLOOD PRESSURE: 70 MMHG | HEART RATE: 78 BPM | HEIGHT: 69 IN | BODY MASS INDEX: 29.33 KG/M2 | SYSTOLIC BLOOD PRESSURE: 124 MMHG

## 2019-03-12 ENCOUNTER — APPOINTMENT (OUTPATIENT)
Dept: LAB | Age: 44
End: 2019-03-12
Attending: FAMILY MEDICINE
Payer: COMMERCIAL

## 2019-03-12 ENCOUNTER — OFFICE VISIT (OUTPATIENT)
Dept: FAMILY MEDICINE CLINIC | Facility: CLINIC | Age: 44
End: 2019-03-12
Payer: COMMERCIAL

## 2019-03-12 VITALS
RESPIRATION RATE: 16 BRPM | TEMPERATURE: 98 F | OXYGEN SATURATION: 99 % | BODY MASS INDEX: 29.63 KG/M2 | DIASTOLIC BLOOD PRESSURE: 72 MMHG | SYSTOLIC BLOOD PRESSURE: 118 MMHG | HEIGHT: 70 IN | HEART RATE: 74 BPM | WEIGHT: 207 LBS

## 2019-03-12 DIAGNOSIS — E78.5 HYPERLIPIDEMIA LDL GOAL <100: ICD-10-CM

## 2019-03-12 DIAGNOSIS — E78.5 HYPERLIPIDEMIA LDL GOAL <100: Primary | ICD-10-CM

## 2019-03-12 LAB
ALBUMIN SERPL-MCNC: 4.2 G/DL (ref 3.4–5)
ALBUMIN/GLOB SERPL: 1.2 {RATIO} (ref 1–2)
ALP LIVER SERPL-CCNC: 62 U/L (ref 45–117)
ALT SERPL-CCNC: 75 U/L (ref 16–61)
ANION GAP SERPL CALC-SCNC: 5 MMOL/L (ref 0–18)
AST SERPL-CCNC: 39 U/L (ref 15–37)
BILIRUB SERPL-MCNC: 0.5 MG/DL (ref 0.1–2)
BUN BLD-MCNC: 13 MG/DL (ref 7–18)
BUN/CREAT SERPL: 13.3 (ref 10–20)
CALCIUM BLD-MCNC: 9.4 MG/DL (ref 8.5–10.1)
CHLORIDE SERPL-SCNC: 107 MMOL/L (ref 98–107)
CHOLEST SMN-MCNC: 147 MG/DL (ref ?–200)
CO2 SERPL-SCNC: 29 MMOL/L (ref 21–32)
CREAT BLD-MCNC: 0.98 MG/DL (ref 0.7–1.3)
GLOBULIN PLAS-MCNC: 3.6 G/DL (ref 2.8–4.4)
GLUCOSE BLD-MCNC: 85 MG/DL (ref 70–99)
HDLC SERPL-MCNC: 59 MG/DL (ref 40–59)
LDLC SERPL CALC-MCNC: 63 MG/DL (ref ?–100)
M PROTEIN MFR SERPL ELPH: 7.8 G/DL (ref 6.4–8.2)
NONHDLC SERPL-MCNC: 88 MG/DL (ref ?–130)
OSMOLALITY SERPL CALC.SUM OF ELEC: 291 MOSM/KG (ref 275–295)
POTASSIUM SERPL-SCNC: 4.3 MMOL/L (ref 3.5–5.1)
SODIUM SERPL-SCNC: 141 MMOL/L (ref 136–145)
TRIGL SERPL-MCNC: 124 MG/DL (ref 30–149)
VLDLC SERPL CALC-MCNC: 25 MG/DL (ref 0–30)

## 2019-03-12 PROCEDURE — 99213 OFFICE O/P EST LOW 20 MIN: CPT | Performed by: FAMILY MEDICINE

## 2019-03-12 PROCEDURE — 36415 COLL VENOUS BLD VENIPUNCTURE: CPT

## 2019-03-12 PROCEDURE — 80061 LIPID PANEL: CPT

## 2019-03-12 PROCEDURE — 80053 COMPREHEN METABOLIC PANEL: CPT

## 2019-03-12 NOTE — PROGRESS NOTES
Yoselin Kidd is a 39year old male who presents for recheck of hyperlipidemia. Patient reports taking medications as instructed, no medication side effects noted. Denies any generalized muscle aches.     Wt Readings from Last 6 Encounters:  03/12/19 : 20 control   • Irregular bowel habits Reoccurring   • Low back sprain    • Other and unspecified hyperlipidemia    • URI (upper respiratory infection)    • Wears glasses Childhood      Past Surgical History:   Procedure Laterality Date   • COLONOSCOPY  2009

## 2019-03-14 ENCOUNTER — PATIENT MESSAGE (OUTPATIENT)
Dept: FAMILY MEDICINE CLINIC | Facility: CLINIC | Age: 44
End: 2019-03-14

## 2019-03-14 NOTE — TELEPHONE ENCOUNTER
From: Bita Naidu  To: Tristan Ponce DO  Sent: 3/14/2019 2:15 PM CDT  Subject: Test Results Question    I have a question about COMP METABOLIC PANEL (14) resulted on 3/12/19, 6:31 PM.    Di Epstein thank you.  I actually just asked the same question about

## 2019-03-19 RX ORDER — ROSUVASTATIN CALCIUM 10 MG/1
TABLET, COATED ORAL
COMMUNITY
Start: 2017-04-25 | End: 2019-05-02 | Stop reason: SDUPTHER

## 2019-05-01 ENCOUNTER — MED REC SCAN ONLY (OUTPATIENT)
Dept: FAMILY MEDICINE CLINIC | Facility: CLINIC | Age: 44
End: 2019-05-01

## 2019-05-02 RX ORDER — ROSUVASTATIN CALCIUM 10 MG/1
10 TABLET, COATED ORAL
COMMUNITY
Start: 2019-01-24

## 2019-05-03 ENCOUNTER — OFFICE VISIT (OUTPATIENT)
Dept: CARDIOLOGY | Age: 44
End: 2019-05-03

## 2019-05-03 DIAGNOSIS — E78.2 HYPERLIPIDEMIA, MIXED: ICD-10-CM

## 2019-05-03 DIAGNOSIS — R74.8 ELEVATED LIVER ENZYMES: ICD-10-CM

## 2019-05-03 DIAGNOSIS — R93.1 ELEVATED CORONARY ARTERY CALCIUM SCORE: Primary | ICD-10-CM

## 2019-05-03 PROCEDURE — 99214 OFFICE O/P EST MOD 30 MIN: CPT | Performed by: NURSE PRACTITIONER

## 2019-05-03 SDOH — HEALTH STABILITY: PHYSICAL HEALTH: ON AVERAGE, HOW MANY MINUTES DO YOU ENGAGE IN EXERCISE AT THIS LEVEL?: 60 MIN

## 2019-05-03 SDOH — HEALTH STABILITY: PHYSICAL HEALTH: ON AVERAGE, HOW MANY DAYS PER WEEK DO YOU ENGAGE IN MODERATE TO STRENUOUS EXERCISE (LIKE A BRISK WALK)?: 5 DAYS

## 2019-05-03 ASSESSMENT — ENCOUNTER SYMPTOMS
EYES NEGATIVE: 1
HEMATOLOGIC/LYMPHATIC NEGATIVE: 1
NEUROLOGICAL NEGATIVE: 1
WEIGHT GAIN: 1
ENDOCRINE NEGATIVE: 1
RESPIRATORY NEGATIVE: 1
GASTROINTESTINAL NEGATIVE: 1

## 2019-05-03 ASSESSMENT — PATIENT HEALTH QUESTIONNAIRE - PHQ9
2. FEELING DOWN, DEPRESSED OR HOPELESS: NOT AT ALL
SUM OF ALL RESPONSES TO PHQ9 QUESTIONS 1 AND 2: 0
1. LITTLE INTEREST OR PLEASURE IN DOING THINGS: NOT AT ALL
SUM OF ALL RESPONSES TO PHQ9 QUESTIONS 1 AND 2: 0

## 2019-05-03 ASSESSMENT — PAIN SCALES - GENERAL: PAINLEVEL: 0

## 2019-07-01 RX ORDER — ROSUVASTATIN CALCIUM 10 MG/1
TABLET, COATED ORAL
Qty: 30 TABLET | Refills: 3 | Status: SHIPPED | OUTPATIENT
Start: 2019-07-01 | End: 2019-11-02

## 2019-07-01 NOTE — TELEPHONE ENCOUNTER
Requested Prescriptions     Pending Prescriptions Disp Refills   • ROSUVASTATIN CALCIUM 10 MG Oral Tab [Pharmacy Med Name: Rosuvastatin Calcium Oral Tablet 10 MG] 30 tablet 3     Sig: TAKE 1 TABLET BY MOUTH ONE TIME A DAY AT NIGHT       LOV: 3/12/2019  RTC

## 2019-09-17 ENCOUNTER — APPOINTMENT (OUTPATIENT)
Dept: LAB | Age: 44
End: 2019-09-17
Attending: FAMILY MEDICINE
Payer: COMMERCIAL

## 2019-09-17 ENCOUNTER — OFFICE VISIT (OUTPATIENT)
Dept: FAMILY MEDICINE CLINIC | Facility: CLINIC | Age: 44
End: 2019-09-17
Payer: COMMERCIAL

## 2019-09-17 VITALS
HEIGHT: 70 IN | SYSTOLIC BLOOD PRESSURE: 116 MMHG | BODY MASS INDEX: 28.77 KG/M2 | WEIGHT: 201 LBS | HEART RATE: 82 BPM | DIASTOLIC BLOOD PRESSURE: 74 MMHG | RESPIRATION RATE: 12 BRPM

## 2019-09-17 DIAGNOSIS — Z12.5 SCREENING FOR PROSTATE CANCER: ICD-10-CM

## 2019-09-17 DIAGNOSIS — Z00.00 LABORATORY EXAMINATION ORDERED AS PART OF A ROUTINE GENERAL MEDICAL EXAMINATION: Primary | ICD-10-CM

## 2019-09-17 DIAGNOSIS — E55.9 VITAMIN D DEFICIENCY: ICD-10-CM

## 2019-09-17 LAB
ALBUMIN SERPL-MCNC: 4.1 G/DL (ref 3.4–5)
ALBUMIN/GLOB SERPL: 1.2 {RATIO} (ref 1–2)
ALP LIVER SERPL-CCNC: 58 U/L (ref 45–117)
ALT SERPL-CCNC: 42 U/L (ref 16–61)
ANION GAP SERPL CALC-SCNC: 2 MMOL/L (ref 0–18)
AST SERPL-CCNC: 22 U/L (ref 15–37)
BASOPHILS # BLD AUTO: 0.03 X10(3) UL (ref 0–0.2)
BASOPHILS NFR BLD AUTO: 0.5 %
BILIRUB SERPL-MCNC: 0.5 MG/DL (ref 0.1–2)
BILIRUB UR QL STRIP.AUTO: NEGATIVE
BUN BLD-MCNC: 12 MG/DL (ref 7–18)
BUN/CREAT SERPL: 12 (ref 10–20)
CALCIUM BLD-MCNC: 9.3 MG/DL (ref 8.5–10.1)
CHLORIDE SERPL-SCNC: 107 MMOL/L (ref 98–112)
CHOLEST SMN-MCNC: 121 MG/DL (ref ?–200)
CLARITY UR REFRACT.AUTO: CLEAR
CO2 SERPL-SCNC: 29 MMOL/L (ref 21–32)
COLOR UR AUTO: YELLOW
CREAT BLD-MCNC: 1 MG/DL (ref 0.7–1.3)
DEPRECATED RDW RBC AUTO: 40.7 FL (ref 35.1–46.3)
EOSINOPHIL # BLD AUTO: 0.1 X10(3) UL (ref 0–0.7)
EOSINOPHIL NFR BLD AUTO: 1.7 %
ERYTHROCYTE [DISTWIDTH] IN BLOOD BY AUTOMATED COUNT: 13 % (ref 11–15)
GLOBULIN PLAS-MCNC: 3.3 G/DL (ref 2.8–4.4)
GLUCOSE BLD-MCNC: 82 MG/DL (ref 70–99)
GLUCOSE UR STRIP.AUTO-MCNC: NEGATIVE MG/DL
HCT VFR BLD AUTO: 43.7 % (ref 39–53)
HDLC SERPL-MCNC: 52 MG/DL (ref 40–59)
HGB BLD-MCNC: 13.7 G/DL (ref 13–17.5)
IMM GRANULOCYTES # BLD AUTO: 0.02 X10(3) UL (ref 0–1)
IMM GRANULOCYTES NFR BLD: 0.3 %
KETONES UR STRIP.AUTO-MCNC: NEGATIVE MG/DL
LDLC SERPL CALC-MCNC: 49 MG/DL (ref ?–100)
LEUKOCYTE ESTERASE UR QL STRIP.AUTO: NEGATIVE
LYMPHOCYTES # BLD AUTO: 1.77 X10(3) UL (ref 1–4)
LYMPHOCYTES NFR BLD AUTO: 29.9 %
M PROTEIN MFR SERPL ELPH: 7.4 G/DL (ref 6.4–8.2)
MCH RBC QN AUTO: 27.3 PG (ref 26–34)
MCHC RBC AUTO-ENTMCNC: 31.4 G/DL (ref 31–37)
MCV RBC AUTO: 87.1 FL (ref 80–100)
MONOCYTES # BLD AUTO: 0.49 X10(3) UL (ref 0.1–1)
MONOCYTES NFR BLD AUTO: 8.3 %
NEUTROPHILS # BLD AUTO: 3.5 X10 (3) UL (ref 1.5–7.7)
NEUTROPHILS # BLD AUTO: 3.5 X10(3) UL (ref 1.5–7.7)
NEUTROPHILS NFR BLD AUTO: 59.3 %
NITRITE UR QL STRIP.AUTO: NEGATIVE
NONHDLC SERPL-MCNC: 69 MG/DL (ref ?–130)
OSMOLALITY SERPL CALC.SUM OF ELEC: 285 MOSM/KG (ref 275–295)
PH UR STRIP.AUTO: 6 [PH] (ref 4.5–8)
PLATELET # BLD AUTO: 189 10(3)UL (ref 150–450)
POTASSIUM SERPL-SCNC: 4.2 MMOL/L (ref 3.5–5.1)
PROT UR STRIP.AUTO-MCNC: NEGATIVE MG/DL
RBC # BLD AUTO: 5.02 X10(6)UL (ref 4.3–5.7)
RBC UR QL AUTO: NEGATIVE
SODIUM SERPL-SCNC: 138 MMOL/L (ref 136–145)
SP GR UR STRIP.AUTO: 1.02 (ref 1–1.03)
TRIGL SERPL-MCNC: 99 MG/DL (ref 30–149)
TSI SER-ACNC: 0.72 MIU/ML (ref 0.36–3.74)
UROBILINOGEN UR STRIP.AUTO-MCNC: <2 MG/DL
VIT D+METAB SERPL-MCNC: 40.8 NG/ML (ref 30–100)
VLDLC SERPL CALC-MCNC: 20 MG/DL (ref 0–30)
WBC # BLD AUTO: 5.9 X10(3) UL (ref 4–11)

## 2019-09-17 PROCEDURE — 99396 PREV VISIT EST AGE 40-64: CPT | Performed by: FAMILY MEDICINE

## 2019-09-17 NOTE — PROGRESS NOTES
Wellness Exam    REASON FOR VISIT:    Yoselin Kidd is a 39year old male who presents for an [address].     Current Complaints: Mr. Milton Magdaleno is a pleasant 40 y/o M here for his wellness exam  Flu Shot: see immunization record  Health Maint External Lab or Procedure   Cholesterol Screening Recommended screening varies with age, risk and gender LDL Cholesterol (mg/dL)   Date Value   03/12/2019 63     LDL CHOLESTROL (mg/dL)   Date Value   02/04/2014 41       Diabetes Screening  If history of hi • Wears glasses Childhood      Past Surgical History:   Procedure Laterality Date   • COLONOSCOPY  2009   • ORAL SURGERY PROCEDURE      wisdom teeth      Family History   Problem Relation Age of Onset   • Hypertension Father    • Colon Polyps Father sounds are normal. There is no tenderness. Musculoskeletal: Normal range of motion. He exhibits no edema. Lymphadenopathy:     He has no cervical adenopathy. Neurological: He is alert and oriented to person, place, and time. He has normal reflexes. on 09/11/2019  Annual Physical due on 09/11/2019  Influenza Vaccine(1) due on 09/01/2019  Colonoscopy due on 09/26/2023  Patient/Caregiver Education:  Patient/Caregiver Education: There are no barriers to learning. Medical education done.   Outcome: Patient

## 2019-09-17 NOTE — PATIENT INSTRUCTIONS
Thank you for choosing Angelica Galdamez MD at [place] [address]  To Do: Tessie Naidu  1. Please see age appropriate health prevention below    [email or web address] is located in [address]. Monday, Tuesday & Friday – 8 a.m. to 4 p.m.   Wednesday, Thurs the benefits outweigh those potential risks and we strive to make you healthier and to improve your quality of life.     Referrals, and Radiology Information:    If your insurance requires a referral to a specialist, please allow 5 business days to process exams   Blood pressure All men in this age group Yearly checkup if your blood pressure reading is normal  Normal blood pressure is less than 120/80 mm Hg  If your blood pressure is higher than normal, follow the advice of your healthcare provider      Depr dose should be given at least 2 months after the second dose and at least 4 months after the first dose   Haemophilus influenzae Type B (HIB) Men at increased risk for infection – talk with your healthcare provider 1 to 3 doses   Influenza (flu) All men in

## 2019-11-04 RX ORDER — ROSUVASTATIN CALCIUM 10 MG/1
TABLET, COATED ORAL
Qty: 30 TABLET | Refills: 3 | Status: SHIPPED | OUTPATIENT
Start: 2019-11-04 | End: 2020-04-02

## 2019-11-04 NOTE — TELEPHONE ENCOUNTER
Requested Prescriptions     Pending Prescriptions Disp Refills   • ROSUVASTATIN CALCIUM 10 MG Oral Tab [Pharmacy Med Name: Rosuvastatin Calcium Oral Tablet 10 MG] 30 tablet 2     Sig: TAKE 1 TABLET BY MOUTH ONE TIME A DAY AT NIGHT       LOV: 9/17/2019  RTC

## 2019-11-09 ENCOUNTER — IMMUNIZATION (OUTPATIENT)
Dept: FAMILY MEDICINE CLINIC | Facility: CLINIC | Age: 44
End: 2019-11-09
Payer: COMMERCIAL

## 2019-11-09 DIAGNOSIS — Z23 NEED FOR VACCINATION: ICD-10-CM

## 2019-11-09 PROCEDURE — 90686 IIV4 VACC NO PRSV 0.5 ML IM: CPT | Performed by: FAMILY MEDICINE

## 2019-11-09 PROCEDURE — 90471 IMMUNIZATION ADMIN: CPT | Performed by: FAMILY MEDICINE

## 2020-04-02 RX ORDER — ROSUVASTATIN CALCIUM 10 MG/1
TABLET, COATED ORAL
Qty: 90 TABLET | Refills: 0 | Status: SHIPPED | OUTPATIENT
Start: 2020-04-02 | End: 2020-06-25

## 2020-04-02 NOTE — TELEPHONE ENCOUNTER
Rosuvastatin Calcium 10 MG Oral Tab               Sig:  TAKE 1 TABLET BY MOUTH ONE TIME A DAY AT NIGHT    Disp:  90 tablet    Refills:  0    Start: 4/2/2020    Class: Normal    Last ordered: 5 months ago by Shaye Ryder PA-C     Cholesterol Medicatio

## 2020-04-02 NOTE — TELEPHONE ENCOUNTER
Angelia Huddleston Pt is requesting a new script as he is changing pharmacy.         ROSUVASTATIN CALCIUM 10 MG Oral Tab 30 tablet 3 11/4/2019     Sig: TAKE 1 TABLET BY MOUTH ONE TIME A DAY AT NIGHT    Sent to pharmacy as: Rosuvastatin Calcium 10 MG Oral Tablet (

## 2020-04-23 ENCOUNTER — TELEPHONE (OUTPATIENT)
Dept: CARDIOLOGY | Age: 45
End: 2020-04-23

## 2020-05-11 ENCOUNTER — V-VISIT (OUTPATIENT)
Dept: CARDIOLOGY | Age: 45
End: 2020-05-11

## 2020-05-11 VITALS
SYSTOLIC BLOOD PRESSURE: 120 MMHG | HEIGHT: 70 IN | DIASTOLIC BLOOD PRESSURE: 76 MMHG | WEIGHT: 215 LBS | BODY MASS INDEX: 30.78 KG/M2 | HEART RATE: 78 BPM

## 2020-05-11 DIAGNOSIS — R93.1 ELEVATED CORONARY ARTERY CALCIUM SCORE: Primary | ICD-10-CM

## 2020-05-11 DIAGNOSIS — E55.9 VITAMIN D DEFICIENCY: ICD-10-CM

## 2020-05-11 DIAGNOSIS — Z82.49 FAMILY HISTORY OF CORONARY ARTERIOSCLEROSIS: ICD-10-CM

## 2020-05-11 DIAGNOSIS — E78.2 HYPERLIPIDEMIA, MIXED: ICD-10-CM

## 2020-05-11 PROCEDURE — 99441 TELEPHONE E&M BY PHYSICIAN EST PT NOT ORIG PREV 7 DAYS 5-10 MIN: CPT | Performed by: INTERNAL MEDICINE

## 2020-05-11 ASSESSMENT — PATIENT HEALTH QUESTIONNAIRE - PHQ9
SUM OF ALL RESPONSES TO PHQ9 QUESTIONS 1 AND 2: 0
1. LITTLE INTEREST OR PLEASURE IN DOING THINGS: NOT AT ALL
2. FEELING DOWN, DEPRESSED OR HOPELESS: NOT AT ALL
2. FEELING DOWN, DEPRESSED OR HOPELESS: NOT AT ALL
SUM OF ALL RESPONSES TO PHQ9 QUESTIONS 1 AND 2: 0
1. LITTLE INTEREST OR PLEASURE IN DOING THINGS: NOT AT ALL
SUM OF ALL RESPONSES TO PHQ9 QUESTIONS 1 AND 2: 0

## 2020-06-25 RX ORDER — ROSUVASTATIN CALCIUM 10 MG/1
TABLET, COATED ORAL
Qty: 90 TABLET | Refills: 1 | Status: SHIPPED | OUTPATIENT
Start: 2020-06-25 | End: 2020-07-13

## 2020-06-25 NOTE — TELEPHONE ENCOUNTER
Cholesterol Medication Protocol Passed6/25 12:36 AM   ALT < 80    ALT resulted within past year    Lipid panel within past 12 months    Appointment within past 12 or next 3 months     Refill protocol passed because the patient met the following protocol fo

## 2020-07-07 ENCOUNTER — TELEPHONE (OUTPATIENT)
Dept: FAMILY MEDICINE CLINIC | Facility: CLINIC | Age: 45
End: 2020-07-07

## 2020-07-07 RX ORDER — ROSUVASTATIN CALCIUM 10 MG/1
10 TABLET, COATED ORAL NIGHTLY
Qty: 90 TABLET | Refills: 1 | OUTPATIENT
Start: 2020-07-07

## 2020-07-07 NOTE — TELEPHONE ENCOUNTER
Cholesterol Medication Protocol Passed7/7 4:44 PM   ALT < 80    ALT resulted within past year    Lipid panel within past 12 months    Appointment within past 12 or next 3 months     Refill protocol passed because the patient met the following protocol for

## 2020-07-10 NOTE — TELEPHONE ENCOUNTER
Patient calling, will like to get refill on Rosuvastatin.  Previous request was denied, requesting refill

## 2020-07-13 RX ORDER — ROSUVASTATIN CALCIUM 10 MG/1
10 TABLET, COATED ORAL NIGHTLY
Qty: 90 TABLET | Refills: 1 | Status: SHIPPED | OUTPATIENT
Start: 2020-07-13 | End: 2021-04-08

## 2021-01-21 ENCOUNTER — TELEMEDICINE (OUTPATIENT)
Dept: FAMILY MEDICINE CLINIC | Facility: CLINIC | Age: 46
End: 2021-01-21

## 2021-01-21 DIAGNOSIS — N52.9 ERECTILE DYSFUNCTION, UNSPECIFIED ERECTILE DYSFUNCTION TYPE: ICD-10-CM

## 2021-01-21 DIAGNOSIS — E78.5 HYPERLIPIDEMIA LDL GOAL <100: Primary | ICD-10-CM

## 2021-01-21 DIAGNOSIS — E55.9 VITAMIN D DEFICIENCY: ICD-10-CM

## 2021-01-21 PROCEDURE — 99214 OFFICE O/P EST MOD 30 MIN: CPT | Performed by: FAMILY MEDICINE

## 2021-01-21 RX ORDER — SILDENAFIL 100 MG/1
100 TABLET, FILM COATED ORAL
Qty: 10 TABLET | Refills: 3 | Status: SHIPPED | OUTPATIENT
Start: 2021-01-21

## 2021-01-21 NOTE — PROGRESS NOTES
HPI:    Patient ID: Casey Siegel is a 54year old male. HPI  Mr. Naidu is a pleasant 69-year-old gentleman with history of hyperlipidemia currently taking rosuvastatin presenting for a video visit follow-up.   He has been taking his meds compliantl needed    Follow-up as scheduled or as needed. Total face to face time was 10 min, more than 50% of the time was spent in counseling and/or coordination of care related to ED, Vit D Deficiency, HLD.     Orders Placed This Encounter      GHASSAN Vuong

## 2021-01-21 NOTE — PATIENT INSTRUCTIONS
Thank you for choosing Sophy Longoria MD at [facility]  To Do: Calixto Naidu  1. Please take meds as directed. [facility] is located in [address]. Monday, Tuesday & Friday – 8 a.m. to 4 p.m.   Wednesday, Thursday – 7 a.m. to 3 p.m those potential risks and we strive to make you healthier and to improve your quality of life.     Referrals, and Radiology Information:    If your insurance requires a referral to a specialist, please allow 5 business days to process your referral request.

## 2021-01-25 ENCOUNTER — TELEPHONE (OUTPATIENT)
Dept: FAMILY MEDICINE CLINIC | Facility: CLINIC | Age: 46
End: 2021-01-25

## 2021-03-22 ENCOUNTER — OFFICE VISIT (OUTPATIENT)
Dept: FAMILY MEDICINE CLINIC | Facility: CLINIC | Age: 46
End: 2021-03-22
Payer: COMMERCIAL

## 2021-03-22 VITALS
DIASTOLIC BLOOD PRESSURE: 70 MMHG | RESPIRATION RATE: 16 BRPM | SYSTOLIC BLOOD PRESSURE: 120 MMHG | HEIGHT: 70 IN | TEMPERATURE: 98 F | BODY MASS INDEX: 30.85 KG/M2 | WEIGHT: 215.5 LBS | HEART RATE: 75 BPM

## 2021-03-22 DIAGNOSIS — J30.9 ALLERGIC RHINITIS, UNSPECIFIED SEASONALITY, UNSPECIFIED TRIGGER: Primary | ICD-10-CM

## 2021-03-22 DIAGNOSIS — J34.2 DEVIATED SEPTUM: ICD-10-CM

## 2021-03-22 PROCEDURE — 3074F SYST BP LT 130 MM HG: CPT | Performed by: FAMILY MEDICINE

## 2021-03-22 PROCEDURE — 3008F BODY MASS INDEX DOCD: CPT | Performed by: FAMILY MEDICINE

## 2021-03-22 PROCEDURE — 3078F DIAST BP <80 MM HG: CPT | Performed by: FAMILY MEDICINE

## 2021-03-22 PROCEDURE — 99213 OFFICE O/P EST LOW 20 MIN: CPT | Performed by: FAMILY MEDICINE

## 2021-03-22 NOTE — PROGRESS NOTES
HPI/Subjective:   Patient ID: Albin Hong is a 54year old male. HPI  Mr. Naidu is a pleasant 58-year-old gentleman with history of dyslipidemia, deviated septum, allergies here today for a nasal lesion which he noted on the left side.   He had pa oropharyngeal exudate. Eyes:      General: No scleral icterus. Conjunctiva/sclera: Conjunctivae normal.   Musculoskeletal:      Cervical back: Neck supple. Lymphadenopathy:      Cervical: No cervical adenopathy.    Neurological:      Mental Status:

## 2021-03-22 NOTE — PATIENT INSTRUCTIONS
Thank you for choosing Savannah Vila MD at [place] [address]  To Do: Hilda Naidu  1. Please see info below  [facility] is located in [address]. Monday, Tuesday & Friday – 8 a.m. to 4 p.m. Wednesday, Thursday – 7 a.m. to 3 p.m.   The lab potential risks and we strive to make you healthier and to improve your quality of life.     Referrals, and Radiology Information:    If your insurance requires a referral to a specialist, please allow 5 business days to process your referral request.    If specialist for testing and further evaluation. Home care  Your healthcare provider may prescribe medicines to help relieve allergy symptoms. These may include oral medicines, nasal sprays, or eye drops.    Ask your provider for advice on how to stay away 911  Call 911 if you have:   · Trouble breathing  · Severe swelling of the face or severe itching of the eyes or mouth  · Wheezing or shortness of breath  · Chest tightness  · Dizziness or lightheadedness  · Feeling of doom  · Stomach pain, bloating, vomit

## 2021-03-24 ENCOUNTER — LAB ENCOUNTER (OUTPATIENT)
Dept: LAB | Age: 46
End: 2021-03-24
Attending: FAMILY MEDICINE
Payer: COMMERCIAL

## 2021-03-24 DIAGNOSIS — E78.5 HYPERLIPIDEMIA LDL GOAL <100: ICD-10-CM

## 2021-03-24 LAB
ALBUMIN SERPL-MCNC: 3.9 G/DL (ref 3.4–5)
ALBUMIN/GLOB SERPL: 1.1 {RATIO} (ref 1–2)
ALP LIVER SERPL-CCNC: 58 U/L
ALT SERPL-CCNC: 70 U/L
ANION GAP SERPL CALC-SCNC: 4 MMOL/L (ref 0–18)
AST SERPL-CCNC: 32 U/L (ref 15–37)
BASOPHILS # BLD AUTO: 0.03 X10(3) UL (ref 0–0.2)
BASOPHILS NFR BLD AUTO: 0.4 %
BILIRUB SERPL-MCNC: 0.5 MG/DL (ref 0.1–2)
BUN BLD-MCNC: 17 MG/DL (ref 7–18)
BUN/CREAT SERPL: 14.5 (ref 10–20)
CALCIUM BLD-MCNC: 9.3 MG/DL (ref 8.5–10.1)
CHLORIDE SERPL-SCNC: 108 MMOL/L (ref 98–112)
CHOLEST SMN-MCNC: 151 MG/DL (ref ?–200)
CO2 SERPL-SCNC: 28 MMOL/L (ref 21–32)
CREAT BLD-MCNC: 1.17 MG/DL
DEPRECATED RDW RBC AUTO: 41 FL (ref 35.1–46.3)
EOSINOPHIL # BLD AUTO: 0.2 X10(3) UL (ref 0–0.7)
EOSINOPHIL NFR BLD AUTO: 2.8 %
ERYTHROCYTE [DISTWIDTH] IN BLOOD BY AUTOMATED COUNT: 13.1 % (ref 11–15)
GLOBULIN PLAS-MCNC: 3.5 G/DL (ref 2.8–4.4)
GLUCOSE BLD-MCNC: 81 MG/DL (ref 70–99)
HCT VFR BLD AUTO: 44.7 %
HDLC SERPL-MCNC: 60 MG/DL (ref 40–59)
HGB BLD-MCNC: 14.1 G/DL
IMM GRANULOCYTES # BLD AUTO: 0.03 X10(3) UL (ref 0–1)
IMM GRANULOCYTES NFR BLD: 0.4 %
LDLC SERPL CALC-MCNC: 70 MG/DL (ref ?–100)
LYMPHOCYTES # BLD AUTO: 2.14 X10(3) UL (ref 1–4)
LYMPHOCYTES NFR BLD AUTO: 29.8 %
M PROTEIN MFR SERPL ELPH: 7.4 G/DL (ref 6.4–8.2)
MCH RBC QN AUTO: 27.5 PG (ref 26–34)
MCHC RBC AUTO-ENTMCNC: 31.5 G/DL (ref 31–37)
MCV RBC AUTO: 87.3 FL
MONOCYTES # BLD AUTO: 0.86 X10(3) UL (ref 0.1–1)
MONOCYTES NFR BLD AUTO: 12 %
NEUTROPHILS # BLD AUTO: 3.93 X10 (3) UL (ref 1.5–7.7)
NEUTROPHILS # BLD AUTO: 3.93 X10(3) UL (ref 1.5–7.7)
NEUTROPHILS NFR BLD AUTO: 54.6 %
NONHDLC SERPL-MCNC: 91 MG/DL (ref ?–130)
OSMOLALITY SERPL CALC.SUM OF ELEC: 291 MOSM/KG (ref 275–295)
PATIENT FASTING Y/N/NP: YES
PATIENT FASTING Y/N/NP: YES
PLATELET # BLD AUTO: 214 10(3)UL (ref 150–450)
POTASSIUM SERPL-SCNC: 4 MMOL/L (ref 3.5–5.1)
RBC # BLD AUTO: 5.12 X10(6)UL
SODIUM SERPL-SCNC: 140 MMOL/L (ref 136–145)
T4 FREE SERPL-MCNC: 0.9 NG/DL (ref 0.8–1.7)
TRIGL SERPL-MCNC: 103 MG/DL (ref 30–149)
TSI SER-ACNC: 1.03 MIU/ML (ref 0.36–3.74)
VIT D+METAB SERPL-MCNC: 56.2 NG/ML (ref 30–100)
VLDLC SERPL CALC-MCNC: 21 MG/DL (ref 0–30)
WBC # BLD AUTO: 7.2 X10(3) UL (ref 4–11)

## 2021-03-24 PROCEDURE — 85025 COMPLETE CBC W/AUTO DIFF WBC: CPT | Performed by: FAMILY MEDICINE

## 2021-03-24 PROCEDURE — 80053 COMPREHEN METABOLIC PANEL: CPT | Performed by: FAMILY MEDICINE

## 2021-03-24 PROCEDURE — 80061 LIPID PANEL: CPT | Performed by: FAMILY MEDICINE

## 2021-03-24 PROCEDURE — 82306 VITAMIN D 25 HYDROXY: CPT | Performed by: FAMILY MEDICINE

## 2021-03-24 PROCEDURE — 84439 ASSAY OF FREE THYROXINE: CPT

## 2021-03-24 PROCEDURE — 84443 ASSAY THYROID STIM HORMONE: CPT

## 2021-03-24 PROCEDURE — 36415 COLL VENOUS BLD VENIPUNCTURE: CPT | Performed by: FAMILY MEDICINE

## 2021-03-30 ENCOUNTER — PATIENT MESSAGE (OUTPATIENT)
Dept: FAMILY MEDICINE CLINIC | Facility: CLINIC | Age: 46
End: 2021-03-30

## 2021-03-30 DIAGNOSIS — R74.01 ELEVATED ALT MEASUREMENT: Primary | ICD-10-CM

## 2021-03-30 NOTE — TELEPHONE ENCOUNTER
ALT is mildly elevated. Likely isolated and not worrisome. Recommend rechecking CMP in 1 week. It might be a good idea to check a liver ultrasound.

## 2021-03-30 NOTE — TELEPHONE ENCOUNTER
From: Meir Naidu  To: Cuca Gomes MD  Sent: 3/30/2021 11:45 AM CDT  Subject: Other    I have a question about COMP METABOLIC PANEL (14) resulted on 3/24/21, 3:40 PM.    Waleska Narvaez I noticed that ALT, in my last test was 42. This time it was 70.  I'm a lit

## 2021-04-08 RX ORDER — ROSUVASTATIN CALCIUM 10 MG/1
TABLET, COATED ORAL
Qty: 90 TABLET | Refills: 1 | Status: SHIPPED | OUTPATIENT
Start: 2021-04-08 | End: 2021-10-11

## 2021-04-13 ENCOUNTER — HOSPITAL ENCOUNTER (OUTPATIENT)
Dept: ULTRASOUND IMAGING | Age: 46
Discharge: HOME | End: 2021-04-13
Attending: FAMILY MEDICINE
Payer: COMMERCIAL

## 2021-04-13 ENCOUNTER — APPOINTMENT (OUTPATIENT)
Dept: LAB | Age: 46
End: 2021-04-13
Attending: FAMILY MEDICINE
Payer: COMMERCIAL

## 2021-04-13 DIAGNOSIS — R74.01 ELEVATED ALT MEASUREMENT: ICD-10-CM

## 2021-04-13 PROCEDURE — 76700 US EXAM ABDOM COMPLETE: CPT | Performed by: FAMILY MEDICINE

## 2021-04-14 ENCOUNTER — PATIENT MESSAGE (OUTPATIENT)
Dept: FAMILY MEDICINE CLINIC | Facility: CLINIC | Age: 46
End: 2021-04-14

## 2021-04-14 DIAGNOSIS — R74.01 ELEVATED ALT MEASUREMENT: Primary | ICD-10-CM

## 2021-04-14 NOTE — TELEPHONE ENCOUNTER
From: Sena Naidu  To: Rosalba Terry MD  Sent: 4/14/2021 9:15 AM CDT  Subject: Test Results Question    I have a question about Ultrasound Scan Abdomen resulted on 4/13/21, 10:42 AM.    Unique Taylor there.  When I went in for my ultrasound, I noticed that vanesat

## 2021-04-15 ENCOUNTER — LAB ENCOUNTER (OUTPATIENT)
Dept: LAB | Age: 46
End: 2021-04-15
Attending: FAMILY MEDICINE
Payer: COMMERCIAL

## 2021-04-15 PROCEDURE — 80053 COMPREHEN METABOLIC PANEL: CPT | Performed by: FAMILY MEDICINE

## 2021-04-15 PROCEDURE — 36415 COLL VENOUS BLD VENIPUNCTURE: CPT | Performed by: FAMILY MEDICINE

## 2021-05-10 ENCOUNTER — OFFICE VISIT (OUTPATIENT)
Dept: CARDIOLOGY | Age: 46
End: 2021-05-10

## 2021-05-10 VITALS
SYSTOLIC BLOOD PRESSURE: 118 MMHG | BODY MASS INDEX: 30.35 KG/M2 | DIASTOLIC BLOOD PRESSURE: 60 MMHG | WEIGHT: 212 LBS | HEART RATE: 72 BPM | HEIGHT: 70 IN

## 2021-05-10 DIAGNOSIS — E78.2 HYPERLIPIDEMIA, MIXED: Primary | ICD-10-CM

## 2021-05-10 DIAGNOSIS — Z82.49 FAMILY HISTORY OF CORONARY ARTERIOSCLEROSIS: ICD-10-CM

## 2021-05-10 DIAGNOSIS — R93.1 ELEVATED CORONARY ARTERY CALCIUM SCORE: ICD-10-CM

## 2021-05-10 PROCEDURE — 99213 OFFICE O/P EST LOW 20 MIN: CPT | Performed by: INTERNAL MEDICINE

## 2021-05-25 VITALS
BODY MASS INDEX: 29.2 KG/M2 | DIASTOLIC BLOOD PRESSURE: 72 MMHG | WEIGHT: 204 LBS | SYSTOLIC BLOOD PRESSURE: 104 MMHG | HEIGHT: 70 IN | HEART RATE: 82 BPM

## 2021-07-21 ENCOUNTER — PATIENT MESSAGE (OUTPATIENT)
Dept: FAMILY MEDICINE CLINIC | Facility: CLINIC | Age: 46
End: 2021-07-21

## 2021-07-22 NOTE — TELEPHONE ENCOUNTER
From: Tulio Naidu  To: Jorje Ramirez MD  Sent: 7/21/2021 12:40 PM CDT  Subject: Non-Urgent Alb[place] Andrewjose Kaplan. I just had a question about the covid vaccine. I haven't gotten it yet, and to be honest, I can't say that I want to at least at this point. (There seems to be too much politics wrapped up with what should just be medicine). Im curious what your advice would be for someone of my age and shape. I'm getting hit from all sides. My folks got it and want me to get it. My wife has it, but regrets getting it. Im not sure what to do. I really am hesitant considering the extremely low percentage of death ( and usually attributed to elderly) but would like to hear your advice. Thanks. Corrina Dorman.

## 2021-10-11 RX ORDER — ROSUVASTATIN CALCIUM 10 MG/1
TABLET, COATED ORAL
Qty: 90 TABLET | Refills: 1 | Status: SHIPPED | OUTPATIENT
Start: 2021-10-11

## 2022-04-10 RX ORDER — ROSUVASTATIN CALCIUM 10 MG/1
TABLET, COATED ORAL
Qty: 90 TABLET | Refills: 1 | Status: SHIPPED | OUTPATIENT
Start: 2022-04-10

## 2022-06-29 RX ORDER — ROSUVASTATIN CALCIUM 10 MG/1
10 TABLET, COATED ORAL NIGHTLY
Qty: 90 TABLET | Refills: 1 | Status: SHIPPED | OUTPATIENT
Start: 2022-06-29

## 2022-07-15 ENCOUNTER — HOSPITAL ENCOUNTER (EMERGENCY)
Age: 47
Discharge: HOME | End: 2022-07-15
Attending: EMERGENCY MEDICINE
Payer: COMMERCIAL

## 2022-07-15 ENCOUNTER — APPOINTMENT (OUTPATIENT)
Dept: CT IMAGING | Age: 47
End: 2022-07-15
Attending: PHYSICIAN ASSISTANT
Payer: COMMERCIAL

## 2022-07-15 ENCOUNTER — APPOINTMENT (OUTPATIENT)
Dept: GENERAL RADIOLOGY | Age: 47
End: 2022-07-15
Attending: PHYSICIAN ASSISTANT
Payer: COMMERCIAL

## 2022-07-15 VITALS
SYSTOLIC BLOOD PRESSURE: 114 MMHG | OXYGEN SATURATION: 99 % | HEIGHT: 70 IN | BODY MASS INDEX: 30.06 KG/M2 | DIASTOLIC BLOOD PRESSURE: 62 MMHG | TEMPERATURE: 98 F | RESPIRATION RATE: 18 BRPM | WEIGHT: 210 LBS | HEART RATE: 88 BPM

## 2022-07-15 DIAGNOSIS — R10.13 EPIGASTRIC PAIN: Primary | ICD-10-CM

## 2022-07-15 LAB
ALBUMIN SERPL-MCNC: 4 G/DL (ref 3.4–5)
ALBUMIN/GLOB SERPL: 1.1 {RATIO} (ref 1–2)
ALP LIVER SERPL-CCNC: 63 U/L
ALT SERPL-CCNC: 50 U/L
ANION GAP SERPL CALC-SCNC: 4 MMOL/L (ref 0–18)
AST SERPL-CCNC: 27 U/L (ref 15–37)
ATRIAL RATE: 95 BPM
BASOPHILS # BLD AUTO: 0.03 X10(3) UL (ref 0–0.2)
BASOPHILS NFR BLD AUTO: 0.3 %
BILIRUB SERPL-MCNC: 0.5 MG/DL (ref 0.1–2)
BUN BLD-MCNC: 12 MG/DL (ref 7–18)
CALCIUM BLD-MCNC: 9.9 MG/DL (ref 8.5–10.1)
CHLORIDE SERPL-SCNC: 106 MMOL/L (ref 98–112)
CO2 SERPL-SCNC: 28 MMOL/L (ref 21–32)
CREAT BLD-MCNC: 1.1 MG/DL
D DIMER PPP FEU-MCNC: 0.55 UG/ML FEU (ref ?–0.5)
EOSINOPHIL # BLD AUTO: 0.02 X10(3) UL (ref 0–0.7)
EOSINOPHIL NFR BLD AUTO: 0.2 %
ERYTHROCYTE [DISTWIDTH] IN BLOOD BY AUTOMATED COUNT: 12.5 %
GLOBULIN PLAS-MCNC: 3.8 G/DL (ref 2.8–4.4)
GLUCOSE BLD-MCNC: 123 MG/DL (ref 70–99)
HCT VFR BLD AUTO: 45.8 %
HGB BLD-MCNC: 14.6 G/DL
IMM GRANULOCYTES # BLD AUTO: 0.03 X10(3) UL (ref 0–1)
IMM GRANULOCYTES NFR BLD: 0.3 %
LIPASE SERPL-CCNC: 84 U/L (ref 73–393)
LYMPHOCYTES # BLD AUTO: 0.92 X10(3) UL (ref 1–4)
LYMPHOCYTES NFR BLD AUTO: 9.3 %
MCH RBC QN AUTO: 27.1 PG (ref 26–34)
MCHC RBC AUTO-ENTMCNC: 31.9 G/DL (ref 31–37)
MCV RBC AUTO: 85.1 FL
MONOCYTES # BLD AUTO: 0.61 X10(3) UL (ref 0.1–1)
MONOCYTES NFR BLD AUTO: 6.2 %
NEUTROPHILS # BLD AUTO: 8.28 X10 (3) UL (ref 1.5–7.7)
NEUTROPHILS # BLD AUTO: 8.28 X10(3) UL (ref 1.5–7.7)
NEUTROPHILS NFR BLD AUTO: 83.7 %
OSMOLALITY SERPL CALC.SUM OF ELEC: 287 MOSM/KG (ref 275–295)
P AXIS: 41 DEGREES
P-R INTERVAL: 148 MS
PLATELET # BLD AUTO: 247 10(3)UL (ref 150–450)
POTASSIUM SERPL-SCNC: 3.9 MMOL/L (ref 3.5–5.1)
PROT SERPL-MCNC: 7.8 G/DL (ref 6.4–8.2)
Q-T INTERVAL: 334 MS
QRS DURATION: 86 MS
QTC CALCULATION (BEZET): 419 MS
R AXIS: 44 DEGREES
RBC # BLD AUTO: 5.38 X10(6)UL
SODIUM SERPL-SCNC: 138 MMOL/L (ref 136–145)
T AXIS: 17 DEGREES
TROPONIN I HIGH SENSITIVITY: 16 NG/L
VENTRICULAR RATE: 95 BPM
WBC # BLD AUTO: 9.9 X10(3) UL (ref 4–11)

## 2022-07-15 PROCEDURE — 93005 ELECTROCARDIOGRAM TRACING: CPT

## 2022-07-15 PROCEDURE — 99284 EMERGENCY DEPT VISIT MOD MDM: CPT | Performed by: EMERGENCY MEDICINE

## 2022-07-15 PROCEDURE — 84484 ASSAY OF TROPONIN QUANT: CPT | Performed by: PHYSICIAN ASSISTANT

## 2022-07-15 PROCEDURE — 93010 ELECTROCARDIOGRAM REPORT: CPT | Performed by: EMERGENCY MEDICINE

## 2022-07-15 PROCEDURE — 36415 COLL VENOUS BLD VENIPUNCTURE: CPT | Performed by: EMERGENCY MEDICINE

## 2022-07-15 PROCEDURE — 71275 CT ANGIOGRAPHY CHEST: CPT | Performed by: PHYSICIAN ASSISTANT

## 2022-07-15 PROCEDURE — 85379 FIBRIN DEGRADATION QUANT: CPT | Performed by: PHYSICIAN ASSISTANT

## 2022-07-15 PROCEDURE — 85025 COMPLETE CBC W/AUTO DIFF WBC: CPT | Performed by: PHYSICIAN ASSISTANT

## 2022-07-15 PROCEDURE — 80053 COMPREHEN METABOLIC PANEL: CPT | Performed by: PHYSICIAN ASSISTANT

## 2022-07-15 PROCEDURE — 71045 X-RAY EXAM CHEST 1 VIEW: CPT | Performed by: PHYSICIAN ASSISTANT

## 2022-07-15 PROCEDURE — 83690 ASSAY OF LIPASE: CPT | Performed by: PHYSICIAN ASSISTANT

## 2022-07-15 RX ORDER — SUCRALFATE 1 G/1
1 TABLET ORAL
Qty: 21 TABLET | Refills: 0 | Status: SHIPPED | OUTPATIENT
Start: 2022-07-15

## 2022-07-15 RX ORDER — IOHEXOL 350 MG/ML
100 INJECTION, SOLUTION INTRAVENOUS
Status: COMPLETED | OUTPATIENT
Start: 2022-07-15 | End: 2022-07-15

## 2022-07-15 RX ORDER — OMEPRAZOLE 40 MG/1
40 CAPSULE, DELAYED RELEASE ORAL DAILY
Qty: 30 CAPSULE | Refills: 0 | Status: SHIPPED | OUTPATIENT
Start: 2022-07-15 | End: 2022-08-14

## 2022-07-15 NOTE — ED INITIAL ASSESSMENT (HPI)
Pt had incident yesterday, after eating pt became bloated and belching,\"bubble in the back of my throat\" + epigastric burning, pt took tums with no relief.    No hx or GERD, + IBS

## 2022-08-02 ENCOUNTER — OFFICE VISIT (OUTPATIENT)
Dept: FAMILY MEDICINE CLINIC | Facility: CLINIC | Age: 47
End: 2022-08-02
Payer: COMMERCIAL

## 2022-08-02 VITALS
HEIGHT: 70 IN | WEIGHT: 212 LBS | RESPIRATION RATE: 14 BRPM | DIASTOLIC BLOOD PRESSURE: 62 MMHG | BODY MASS INDEX: 30.35 KG/M2 | HEART RATE: 76 BPM | SYSTOLIC BLOOD PRESSURE: 120 MMHG | TEMPERATURE: 98 F

## 2022-08-02 DIAGNOSIS — H65.01 RIGHT ACUTE SEROUS OTITIS MEDIA, RECURRENCE NOT SPECIFIED: ICD-10-CM

## 2022-08-02 DIAGNOSIS — R10.13 EPIGASTRIC PAIN: ICD-10-CM

## 2022-08-02 DIAGNOSIS — Z00.00 WELLNESS EXAMINATION: Primary | ICD-10-CM

## 2022-08-02 PROCEDURE — 99396 PREV VISIT EST AGE 40-64: CPT | Performed by: FAMILY MEDICINE

## 2022-08-02 PROCEDURE — 3074F SYST BP LT 130 MM HG: CPT | Performed by: FAMILY MEDICINE

## 2022-08-02 PROCEDURE — 3008F BODY MASS INDEX DOCD: CPT | Performed by: FAMILY MEDICINE

## 2022-08-02 PROCEDURE — 99213 OFFICE O/P EST LOW 20 MIN: CPT | Performed by: FAMILY MEDICINE

## 2022-08-02 PROCEDURE — 3078F DIAST BP <80 MM HG: CPT | Performed by: FAMILY MEDICINE

## 2022-08-02 RX ORDER — AMOXICILLIN 500 MG/1
500 CAPSULE ORAL 2 TIMES DAILY
Qty: 14 CAPSULE | Refills: 0 | Status: SHIPPED | OUTPATIENT
Start: 2022-08-02 | End: 2022-08-09

## 2022-08-23 RX ORDER — NIRMATRELVIR AND RITONAVIR 300-100 MG
KIT ORAL
Qty: 30 EACH | Refills: 0 | Status: SHIPPED | OUTPATIENT
Start: 2022-08-23 | End: 2022-08-28

## 2022-12-01 ENCOUNTER — TELEMEDICINE (OUTPATIENT)
Dept: FAMILY MEDICINE CLINIC | Facility: CLINIC | Age: 47
End: 2022-12-01
Payer: COMMERCIAL

## 2022-12-01 DIAGNOSIS — J01.11 ACUTE RECURRENT FRONTAL SINUSITIS: Primary | ICD-10-CM

## 2022-12-01 RX ORDER — AMOXICILLIN AND CLAVULANATE POTASSIUM 875; 125 MG/1; MG/1
1 TABLET, FILM COATED ORAL 2 TIMES DAILY
Qty: 20 TABLET | Refills: 0 | Status: SHIPPED | OUTPATIENT
Start: 2022-12-01 | End: 2022-12-11

## 2022-12-03 ENCOUNTER — OFFICE VISIT (OUTPATIENT)
Dept: FAMILY MEDICINE CLINIC | Facility: CLINIC | Age: 47
End: 2022-12-03
Payer: COMMERCIAL

## 2022-12-03 VITALS
HEART RATE: 92 BPM | DIASTOLIC BLOOD PRESSURE: 70 MMHG | TEMPERATURE: 98 F | SYSTOLIC BLOOD PRESSURE: 112 MMHG | RESPIRATION RATE: 16 BRPM | OXYGEN SATURATION: 97 %

## 2022-12-03 DIAGNOSIS — J20.9 ACUTE BRONCHITIS WITH BRONCHOSPASM: Primary | ICD-10-CM

## 2022-12-03 DIAGNOSIS — J01.40 ACUTE NON-RECURRENT PANSINUSITIS: ICD-10-CM

## 2022-12-03 PROCEDURE — 99213 OFFICE O/P EST LOW 20 MIN: CPT | Performed by: NURSE PRACTITIONER

## 2022-12-03 PROCEDURE — 3074F SYST BP LT 130 MM HG: CPT | Performed by: NURSE PRACTITIONER

## 2022-12-03 PROCEDURE — 3078F DIAST BP <80 MM HG: CPT | Performed by: NURSE PRACTITIONER

## 2022-12-03 RX ORDER — ALBUTEROL SULFATE 90 UG/1
2 AEROSOL, METERED RESPIRATORY (INHALATION) EVERY 4 HOURS PRN
Qty: 1 EACH | Refills: 0 | Status: SHIPPED | OUTPATIENT
Start: 2022-12-03

## 2022-12-03 RX ORDER — METHYLPREDNISOLONE 4 MG/1
TABLET ORAL
Qty: 21 TABLET | Refills: 0 | Status: SHIPPED | OUTPATIENT
Start: 2022-12-03

## 2023-02-10 ENCOUNTER — OFFICE VISIT (OUTPATIENT)
Dept: FAMILY MEDICINE CLINIC | Facility: CLINIC | Age: 48
End: 2023-02-10
Payer: COMMERCIAL

## 2023-02-10 VITALS
BODY MASS INDEX: 29.35 KG/M2 | HEART RATE: 78 BPM | DIASTOLIC BLOOD PRESSURE: 75 MMHG | TEMPERATURE: 98 F | WEIGHT: 205 LBS | OXYGEN SATURATION: 96 % | SYSTOLIC BLOOD PRESSURE: 133 MMHG | HEIGHT: 70 IN | RESPIRATION RATE: 16 BRPM

## 2023-02-10 DIAGNOSIS — J20.9 ACUTE BRONCHITIS, UNSPECIFIED ORGANISM: Primary | ICD-10-CM

## 2023-02-10 PROBLEM — R93.1 ELEVATED CORONARY ARTERY CALCIUM SCORE: Status: ACTIVE | Noted: 2019-05-03

## 2023-02-10 PROCEDURE — 3075F SYST BP GE 130 - 139MM HG: CPT

## 2023-02-10 PROCEDURE — 3008F BODY MASS INDEX DOCD: CPT

## 2023-02-10 PROCEDURE — 3078F DIAST BP <80 MM HG: CPT

## 2023-02-10 PROCEDURE — 99213 OFFICE O/P EST LOW 20 MIN: CPT

## 2023-02-10 RX ORDER — ALBUTEROL SULFATE 90 UG/1
2 AEROSOL, METERED RESPIRATORY (INHALATION) EVERY 4 HOURS PRN
Qty: 1 EACH | Refills: 0 | Status: SHIPPED | OUTPATIENT
Start: 2023-02-10 | End: 2023-02-17

## 2023-02-10 RX ORDER — BENZONATATE 200 MG/1
200 CAPSULE ORAL 3 TIMES DAILY PRN
Qty: 30 CAPSULE | Refills: 0 | Status: SHIPPED | OUTPATIENT
Start: 2023-02-10 | End: 2023-02-20

## 2023-03-24 RX ORDER — ROSUVASTATIN CALCIUM 10 MG/1
TABLET, COATED ORAL
Qty: 90 TABLET | Refills: 1 | Status: SHIPPED | OUTPATIENT
Start: 2023-03-24

## 2023-08-01 ENCOUNTER — LAB ENCOUNTER (OUTPATIENT)
Dept: LAB | Age: 48
End: 2023-08-01
Attending: INTERNAL MEDICINE
Payer: COMMERCIAL

## 2023-08-01 DIAGNOSIS — Z00.00 LABORATORY EXAMINATION ORDERED AS PART OF A ROUTINE GENERAL MEDICAL EXAMINATION: ICD-10-CM

## 2023-08-01 DIAGNOSIS — E78.2 HYPERLIPIDEMIA, MIXED: ICD-10-CM

## 2023-08-01 DIAGNOSIS — E55.9 VITAMIN D DEFICIENCY: ICD-10-CM

## 2023-08-01 LAB
ALBUMIN SERPL-MCNC: 4.3 G/DL (ref 3.4–5)
ALBUMIN/GLOB SERPL: 1.3 {RATIO} (ref 1–2)
ALP LIVER SERPL-CCNC: 55 U/L
ALT SERPL-CCNC: 48 U/L
ANION GAP SERPL CALC-SCNC: 3 MMOL/L (ref 0–18)
AST SERPL-CCNC: 29 U/L (ref 15–37)
BASOPHILS # BLD AUTO: 0.03 X10(3) UL (ref 0–0.2)
BASOPHILS NFR BLD AUTO: 0.5 %
BILIRUB SERPL-MCNC: 0.7 MG/DL (ref 0.1–2)
BUN BLD-MCNC: 14 MG/DL (ref 7–18)
CALCIUM BLD-MCNC: 9.6 MG/DL (ref 8.5–10.1)
CHLORIDE SERPL-SCNC: 107 MMOL/L (ref 98–112)
CHOLEST SERPL-MCNC: 131 MG/DL (ref ?–200)
CO2 SERPL-SCNC: 29 MMOL/L (ref 21–32)
CREAT BLD-MCNC: 1.32 MG/DL
EGFRCR SERPLBLD CKD-EPI 2021: 67 ML/MIN/1.73M2 (ref 60–?)
EOSINOPHIL # BLD AUTO: 0.2 X10(3) UL (ref 0–0.7)
EOSINOPHIL NFR BLD AUTO: 3.1 %
ERYTHROCYTE [DISTWIDTH] IN BLOOD BY AUTOMATED COUNT: 12.6 %
FASTING PATIENT LIPID ANSWER: YES
FASTING STATUS PATIENT QL REPORTED: YES
GLOBULIN PLAS-MCNC: 3.2 G/DL (ref 2.8–4.4)
GLUCOSE BLD-MCNC: 95 MG/DL (ref 70–99)
HCT VFR BLD AUTO: 44 %
HDLC SERPL-MCNC: 60 MG/DL (ref 40–59)
HGB BLD-MCNC: 14.2 G/DL
IMM GRANULOCYTES # BLD AUTO: 0.02 X10(3) UL (ref 0–1)
IMM GRANULOCYTES NFR BLD: 0.3 %
LDLC SERPL CALC-MCNC: 55 MG/DL (ref ?–100)
LYMPHOCYTES # BLD AUTO: 2.06 X10(3) UL (ref 1–4)
LYMPHOCYTES NFR BLD AUTO: 32.3 %
MCH RBC QN AUTO: 27.5 PG (ref 26–34)
MCHC RBC AUTO-ENTMCNC: 32.3 G/DL (ref 31–37)
MCV RBC AUTO: 85.3 FL
MONOCYTES # BLD AUTO: 0.63 X10(3) UL (ref 0.1–1)
MONOCYTES NFR BLD AUTO: 9.9 %
NEUTROPHILS # BLD AUTO: 3.44 X10 (3) UL (ref 1.5–7.7)
NEUTROPHILS # BLD AUTO: 3.44 X10(3) UL (ref 1.5–7.7)
NEUTROPHILS NFR BLD AUTO: 53.9 %
NONHDLC SERPL-MCNC: 71 MG/DL (ref ?–130)
OSMOLALITY SERPL CALC.SUM OF ELEC: 288 MOSM/KG (ref 275–295)
PLATELET # BLD AUTO: 220 10(3)UL (ref 150–450)
POTASSIUM SERPL-SCNC: 4.5 MMOL/L (ref 3.5–5.1)
PROT SERPL-MCNC: 7.5 G/DL (ref 6.4–8.2)
RBC # BLD AUTO: 5.16 X10(6)UL
SODIUM SERPL-SCNC: 139 MMOL/L (ref 136–145)
T4 FREE SERPL-MCNC: 1 NG/DL (ref 0.8–1.7)
TRIGL SERPL-MCNC: 79 MG/DL (ref 30–149)
TSI SER-ACNC: 1.01 MIU/ML (ref 0.36–3.74)
VIT D+METAB SERPL-MCNC: 58.3 NG/ML (ref 30–100)
VLDLC SERPL CALC-MCNC: 11 MG/DL (ref 0–30)
WBC # BLD AUTO: 6.4 X10(3) UL (ref 4–11)

## 2023-08-01 PROCEDURE — 80061 LIPID PANEL: CPT

## 2023-08-01 PROCEDURE — 85025 COMPLETE CBC W/AUTO DIFF WBC: CPT

## 2023-08-01 PROCEDURE — 84443 ASSAY THYROID STIM HORMONE: CPT

## 2023-08-01 PROCEDURE — 36415 COLL VENOUS BLD VENIPUNCTURE: CPT

## 2023-08-01 PROCEDURE — 80053 COMPREHEN METABOLIC PANEL: CPT

## 2023-08-01 PROCEDURE — 82306 VITAMIN D 25 HYDROXY: CPT

## 2023-08-01 PROCEDURE — 84439 ASSAY OF FREE THYROXINE: CPT

## 2023-08-03 ENCOUNTER — OFFICE VISIT (OUTPATIENT)
Dept: FAMILY MEDICINE CLINIC | Facility: CLINIC | Age: 48
End: 2023-08-03
Payer: COMMERCIAL

## 2023-08-03 VITALS
HEIGHT: 70 IN | OXYGEN SATURATION: 98 % | DIASTOLIC BLOOD PRESSURE: 74 MMHG | SYSTOLIC BLOOD PRESSURE: 118 MMHG | RESPIRATION RATE: 16 BRPM | TEMPERATURE: 98 F | BODY MASS INDEX: 30.49 KG/M2 | WEIGHT: 213 LBS | HEART RATE: 80 BPM

## 2023-08-03 DIAGNOSIS — Z00.00 WELLNESS EXAMINATION: Primary | ICD-10-CM

## 2023-08-03 DIAGNOSIS — E78.2 HYPERLIPIDEMIA, MIXED: ICD-10-CM

## 2023-08-03 PROCEDURE — 3008F BODY MASS INDEX DOCD: CPT | Performed by: FAMILY MEDICINE

## 2023-08-03 PROCEDURE — 3074F SYST BP LT 130 MM HG: CPT | Performed by: FAMILY MEDICINE

## 2023-08-03 PROCEDURE — 99396 PREV VISIT EST AGE 40-64: CPT | Performed by: FAMILY MEDICINE

## 2023-08-03 PROCEDURE — 3078F DIAST BP <80 MM HG: CPT | Performed by: FAMILY MEDICINE

## 2023-08-03 NOTE — PATIENT INSTRUCTIONS
Thank you for choosing Dayana Bhat MD at [place] [address]  To Do: Noel Naidu  1. Please see age appropriate health prevention below    [facility] is located in [address]. Monday, Tuesday & Friday - 8 a.m. to 4 p.m. Wednesday, Thursday - 7 a.m. to 3 p.m. The lab is closed daily from 12 p.m.-12:30 p.m. Saturday lab hours by appointment. Call [phone] to schedule the appointment. Please signup for [employer] Everything, which is electronic access to your record if you have not done so. All your results will post on there. [email or web address]   You can NOW use [employer] of Everything to book your appointments with us, or consider using open access scheduling which is through the [facility] website [email or web address] and type in Dayana Bhat MD and follow the links for \"Schedule Online Now\"    To schedule Imaging or tests at [facility] Scheduling [phone], Go to [facility] A ER Building (For example: CT scans, X rays, Ultrasound, MRI)  Cardiac Testing in ER building Building A second floor Cardiac Testing [phone] (For example: Holter Monitor, Cardiac Stress tests,Event Monitor, or 2D Echocardiograms)  [facility] call [phone] usually in [facility] A  Walk in Clinic in [place] at [facility]. [address] Mon-Fri at 8am-7:30 p.m., and Sat/Sun 9:00a. m.-4:30 p.m. Also at [address] [employer]  Call [phone] for info     Please call our office about any questions regarding your treatment/medicines/tests as a result of today's visit. For your safety, read the entire package insert of all medicines prescribed to you and be aware of all of the risks of treatment even beyond those discussed today. All therapies have potential risk of harm or side effects or medication interactions.   It is your duty and for your safety to discuss with the pharmacist and our office with questions, and to notify us and stop treatment if problems arise, but know that our intention is that the benefits outweigh those potential risks and we strive to make you healthier and to improve your quality of life. Referrals, and Radiology Information:    If your insurance requires a referral to a specialist, please allow 5 business days to process your referral request.    If Vishnu Kumar MD orders a CT or MRI, it may take up to 10 business days to receive approval from your insurance company. Once our office has called informing you that the insurance company approved your testing, please call Central Scheduling at [phone]  Please allow our office 5 business days to contact you regarding any testing results. Refill policies:   Allow 3 business days for refills; controlled substances may take longer and must be picked up from the office in person. Narcotic medications can only be filled in 30 day increments and must be refilled at an office visit only. If your prescription is due for a refill, you may be due for a follow-up appointment. We cannot refill your maintenance medications at a preventative wellness visit. To best provide you care, patients receiving maintenance medications need to be seen at least twice a year.

## 2023-09-17 RX ORDER — ROSUVASTATIN CALCIUM 10 MG/1
10 TABLET, COATED ORAL NIGHTLY
Qty: 90 TABLET | Refills: 1 | Status: SHIPPED | OUTPATIENT
Start: 2023-09-17

## 2023-09-27 ENCOUNTER — PATIENT MESSAGE (OUTPATIENT)
Dept: FAMILY MEDICINE CLINIC | Facility: CLINIC | Age: 48
End: 2023-09-27

## 2023-09-27 NOTE — TELEPHONE ENCOUNTER
From: Noel Naidu  To: Dayana Bhat  Sent: 9/27/2023 11:34 AM CDT  Subject: Colorectal Cancer Screening    Hi Dr. Perfecto Mccarty. I just had a quick question. I had a message come through stating that I should get a colorectal cancer screening. I have a colonoscopy coming up in October. Is this the same type of exam or is this something different?     Libertad Schuster

## 2023-10-13 PROBLEM — Z86.010 PERSONAL HISTORY OF COLONIC POLYPS: Status: ACTIVE | Noted: 2023-10-13

## 2023-10-13 PROBLEM — Z86.0100 PERSONAL HISTORY OF COLONIC POLYPS: Status: ACTIVE | Noted: 2023-10-13

## 2024-02-14 RX ORDER — ROSUVASTATIN CALCIUM 10 MG/1
10 TABLET, COATED ORAL NIGHTLY
Qty: 90 TABLET | Refills: 1 | Status: SHIPPED | OUTPATIENT
Start: 2024-02-14

## 2024-08-14 RX ORDER — ROSUVASTATIN CALCIUM 10 MG/1
10 TABLET, COATED ORAL NIGHTLY
Qty: 90 TABLET | Refills: 1 | Status: SHIPPED | OUTPATIENT
Start: 2024-08-14

## 2024-09-22 ENCOUNTER — PATIENT MESSAGE (OUTPATIENT)
Dept: FAMILY MEDICINE CLINIC | Facility: CLINIC | Age: 49
End: 2024-09-22

## 2024-09-23 ENCOUNTER — OFFICE VISIT (OUTPATIENT)
Dept: FAMILY MEDICINE CLINIC | Facility: CLINIC | Age: 49
End: 2024-09-23
Payer: COMMERCIAL

## 2024-09-23 VITALS
RESPIRATION RATE: 16 BRPM | WEIGHT: 214 LBS | BODY MASS INDEX: 30.64 KG/M2 | HEART RATE: 91 BPM | SYSTOLIC BLOOD PRESSURE: 118 MMHG | TEMPERATURE: 99 F | DIASTOLIC BLOOD PRESSURE: 72 MMHG | HEIGHT: 70 IN | OXYGEN SATURATION: 98 %

## 2024-09-23 DIAGNOSIS — J01.10 ACUTE NON-RECURRENT FRONTAL SINUSITIS: ICD-10-CM

## 2024-09-23 DIAGNOSIS — B34.9 VIRAL SYNDROME: Primary | ICD-10-CM

## 2024-09-23 PROCEDURE — 87635 SARS-COV-2 COVID-19 AMP PRB: CPT | Performed by: PHYSICIAN ASSISTANT

## 2024-09-23 NOTE — PROGRESS NOTES
CHIEF COMPLAINT:     Chief Complaint   Patient presents with    Sinus Problem     4 days, sinus pressure, cough  OTC dayquill       HPI:   Bridger Naidu is a 48 year old male who presents with URI symptoms for 4 days.  Patient with history deviated septum.      Fever:     Yes []     No [x]       Fatigue: Yes [x]     No []   Stuffy nose : Yes [x]     No []      Sinus pressure: Yes [x]     No []   Frontal   Sore throat:   Yes []      No [x]      Ear Pain:  Yes []      No [x]      Cough:    Yes [x]     No []       Dry [x]     Productive []   Shortness of breath:  Yes []   No [x]     Wheezing:   Yes []   No [x]     Chest pain/pressure:     Yes []     No [x]      GI symptoms: Yes []     No [x]                     Nausea  []; Vomiting  []; Diarrhea  [] ; Upset stomach [];   Abdominal Pain  []       Patient has tried dayquil for symptoms.       Current Outpatient Medications   Medication Sig Dispense Refill    [START ON 9/26/2024] amoxicillin clavulanate 875-125 MG Oral Tab Take 1 tablet by mouth 2 (two) times daily for 10 days. 20 tablet 0    ROSUVASTATIN 10 MG Oral Tab TAKE 1 TABLET BY MOUTH EVERY DAY AT NIGHT 90 tablet 1    PEG 3350-KCl-Na Bicarb-NaCl 420 g Oral Recon Soln Take as directed by physician 4000 mL 0    Sildenafil Citrate 100 MG Oral Tab Take 1 tablet (100 mg total) by mouth daily as needed for Erectile Dysfunction. 10 tablet 3    aspirin 81 MG Oral Tab Take 1 tablet (81 mg total) by mouth daily.      Cholecalciferol (VITAMIN D3) 1000 UNITS Oral Cap Take 1 tablet by mouth daily.      Multiple Vitamin (MULTI-VITAMIN) Oral Tab Take 1 tablet by mouth daily.        Past Medical History:    Abdominal distention    Abdominal pain    Back pain    Threw back out. Occassional lower left pain.    Bloating    Calculus of kidney    Constipation    Decorative tattoo    Diarrhea    Diarrhea, unspecified    Fatigue    Hemorrhoids    High cholesterol    Has been brought under control    Irregular bowel habits    Low back  sprain    Other and unspecified hyperlipidemia    URI (upper respiratory infection)    Wears glasses      Past Surgical History:   Procedure Laterality Date    Colonoscopy  2009    Oral surgery procedure      wisdom teeth         Social History     Socioeconomic History    Marital status:    Tobacco Use    Smoking status: Never    Smokeless tobacco: Never   Vaping Use    Vaping status: Never Used   Substance and Sexual Activity    Alcohol use: Yes     Comment: Socially    Drug use: No   Other Topics Concern    Caffeine Concern Yes     Comment: 2 daily    Exercise Yes     Comment: daily     Social Determinants of Health     Physical Activity: Sufficiently Active (5/3/2019)    [facility]    Exercise Vital Sign     Days of Exercise per Week: 5 days     Minutes of Exercise per Session: 60 min         REVIEW OF SYSTEMS:   GENERAL: Normal appetite  SKIN: no rashes or abnormal skin lesions  HEENT: See HPI  LUNGS: denies shortness of breath or wheezing, See HPI  CARDIOVASCULAR: denies chest pain or palpitations   GI: denies N/V/C or abdominal pain  NEURO: +sinus  headaches    EXAM:   /72   Pulse 91   Temp 98.5 °F (36.9 °C)   Resp 16   Ht 5' 10\" (1.778 m)   Wt 214 lb (97.1 kg)   SpO2 98%   BMI 30.71 kg/m²   GENERAL: well developed, well nourished,in no apparent distress  SKIN: no rashes,no suspicious lesions  HEAD: atraumatic, normocephalic.  No tenderness on palpation of maxillary sinuses.  + tenderness on palpation of frontal sinuses.  EYES: conjunctiva clear, EOM intact  EARS: TM's not erythematous, no bulging, no retraction, no fluid, bony landmarks intact.  EACs WNL BL.    NOSE: Nostrils patent, no nasal discharge, nasal mucosa pink  THROAT: oral mucosa pink, moist. Posterior pharynx not erythematous or injected. No exudates.  No uvular deviation, drooling, muffled voice, hot potato voice, trismus, or signs of abscess.   NECK: Supple,  non-tender  LUNGS: clear to auscultation bilaterally, no wheezes or rhonchi. Breathing is non labored.  CARDIO: RRR without murmur  EXTREMITIES: no cyanosis, clubbing or edema  LYMPH:  No anterior cervical lymphadenopathy. No submandibular lymphadenopathy.  No posterior cervical or occipital lymphadenopathy.    No results found for this or any previous visit (from the past 24 hour(s)).    ASSESSMENT AND PLAN:   Bridger Naidu is a 48 year old male who presents with symptoms that are consistent with    ASSESSMENT:   Encounter Diagnoses   Name Primary?    Viral syndrome Yes    Acute non-recurrent frontal sinusitis        PLAN: Meds as below.  See patient Instructions.  See attached patient references.   -Patient reports history of deviated septum and prone to sinus infections. Discussed symptoms for only 4 days at this point.  Covid test sent to lab.  Post-dated script provided if symptoms are not improving given his history.  Also discussed not antibiotic is needed if Covid test is positive.    Meds & Refills for this Visit:  Requested Prescriptions     Signed Prescriptions Disp Refills    amoxicillin clavulanate 875-125 MG Oral Tab 20 tablet 0     Sig: Take 1 tablet by mouth 2 (two) times daily for 10 days.       Risks, benefits, and side effects of medication explained and discussed.      Patient Instructions   -Flonase  -Neilmed sinus rinse  -Sudafed  -Zyrtec  -Push fluids  -Cool mist humidifier  -Tea with honey  -Tylenol/motrin as needed  -Probiotic if take antibiotic  -Must be seen with worsening symptoms        The patient/parent indicates understanding of these issues and agrees to the plan.

## 2024-09-23 NOTE — PATIENT INSTRUCTIONS
-Flonase  -Neilmed sinus rinse  -Sudafed  -Zyrtec  -Push fluids  -Cool mist humidifier  -Tea with honey  -Tylenol/motrin as needed  -Probiotic if take antibiotic  -Must be seen with worsening symptoms

## 2024-09-23 NOTE — TELEPHONE ENCOUNTER
From: Bridger Hebert  To: Hugo Cohen  Sent: 9/22/2024 3:19 PM CDT  Subject: Sinus infection    Hey Dr. Cohen. So I think I’ve got myself a sinus infection. I tend to get these cause I have a deviated septum. No fever, but coughing and clogged head. All the usual symptoms. Do I need to come In or can you just order me some antibiotics?   Bridger Hebert

## 2024-09-24 LAB — SARS-COV-2 RNA RESP QL NAA+PROBE: NOT DETECTED

## 2024-11-04 PROBLEM — Z86.0102 PERSONAL HISTORY OF HYPERPLASTIC COLON POLYPS: Status: ACTIVE | Noted: 2024-11-04

## 2025-02-12 RX ORDER — ROSUVASTATIN CALCIUM 10 MG/1
10 TABLET, COATED ORAL NIGHTLY
Qty: 90 TABLET | Refills: 1 | Status: SHIPPED | OUTPATIENT
Start: 2025-02-12

## 2025-04-21 ENCOUNTER — LAB ENCOUNTER (OUTPATIENT)
Dept: LAB | Age: 50
End: 2025-04-21
Attending: FAMILY MEDICINE
Payer: COMMERCIAL

## 2025-04-21 ENCOUNTER — OFFICE VISIT (OUTPATIENT)
Dept: FAMILY MEDICINE CLINIC | Facility: CLINIC | Age: 50
End: 2025-04-21
Payer: COMMERCIAL

## 2025-04-21 ENCOUNTER — PATIENT MESSAGE (OUTPATIENT)
Dept: FAMILY MEDICINE CLINIC | Facility: CLINIC | Age: 50
End: 2025-04-21

## 2025-04-21 VITALS
OXYGEN SATURATION: 98 % | HEART RATE: 72 BPM | DIASTOLIC BLOOD PRESSURE: 70 MMHG | WEIGHT: 216 LBS | BODY MASS INDEX: 30.92 KG/M2 | TEMPERATURE: 98 F | SYSTOLIC BLOOD PRESSURE: 118 MMHG | RESPIRATION RATE: 16 BRPM | HEIGHT: 70 IN

## 2025-04-21 DIAGNOSIS — E55.9 VITAMIN D DEFICIENCY: ICD-10-CM

## 2025-04-21 DIAGNOSIS — R74.8 ELEVATED LIVER ENZYMES: ICD-10-CM

## 2025-04-21 DIAGNOSIS — Z00.00 WELLNESS EXAMINATION: ICD-10-CM

## 2025-04-21 DIAGNOSIS — Z12.5 SCREENING FOR MALIGNANT NEOPLASM OF PROSTATE: ICD-10-CM

## 2025-04-21 DIAGNOSIS — Z00.00 WELLNESS EXAMINATION: Primary | ICD-10-CM

## 2025-04-21 PROBLEM — R19.4 CHANGE IN BOWEL HABITS: Status: RESOLVED | Noted: 2018-09-26 | Resolved: 2025-04-21

## 2025-04-21 PROBLEM — K92.1 MELENA: Status: RESOLVED | Noted: 2018-09-26 | Resolved: 2025-04-21

## 2025-04-21 LAB
ALBUMIN SERPL-MCNC: 4.9 G/DL (ref 3.2–4.8)
ALBUMIN/GLOB SERPL: 1.8 {RATIO} (ref 1–2)
ALP LIVER SERPL-CCNC: 52 U/L (ref 45–117)
ALT SERPL-CCNC: 57 U/L (ref 10–49)
ANION GAP SERPL CALC-SCNC: 10 MMOL/L (ref 0–18)
AST SERPL-CCNC: 36 U/L (ref ?–34)
BASOPHILS # BLD AUTO: 0.03 X10(3) UL (ref 0–0.2)
BASOPHILS NFR BLD AUTO: 0.4 %
BILIRUB SERPL-MCNC: 0.8 MG/DL (ref 0.3–1.2)
BUN BLD-MCNC: 14 MG/DL (ref 9–23)
CALCIUM BLD-MCNC: 9.7 MG/DL (ref 8.7–10.6)
CHLORIDE SERPL-SCNC: 105 MMOL/L (ref 98–112)
CHOLEST SERPL-MCNC: 155 MG/DL (ref ?–200)
CO2 SERPL-SCNC: 27 MMOL/L (ref 21–32)
COMPLEXED PSA SERPL-MCNC: 0.66 NG/ML (ref ?–4)
CREAT BLD-MCNC: 1.11 MG/DL (ref 0.7–1.3)
EGFRCR SERPLBLD CKD-EPI 2021: 81 ML/MIN/1.73M2 (ref 60–?)
EOSINOPHIL # BLD AUTO: 0.17 X10(3) UL (ref 0–0.7)
EOSINOPHIL NFR BLD AUTO: 2.5 %
ERYTHROCYTE [DISTWIDTH] IN BLOOD BY AUTOMATED COUNT: 13 %
FASTING PATIENT LIPID ANSWER: YES
FASTING STATUS PATIENT QL REPORTED: YES
GLOBULIN PLAS-MCNC: 2.8 G/DL (ref 2–3.5)
GLUCOSE BLD-MCNC: 90 MG/DL (ref 70–99)
HCT VFR BLD AUTO: 42.8 % (ref 39–53)
HDLC SERPL-MCNC: 58 MG/DL (ref 40–59)
HGB BLD-MCNC: 13.9 G/DL (ref 13–17.5)
IMM GRANULOCYTES # BLD AUTO: 0.03 X10(3) UL (ref 0–1)
IMM GRANULOCYTES NFR BLD: 0.4 %
LDLC SERPL CALC-MCNC: 70 MG/DL (ref ?–100)
LYMPHOCYTES # BLD AUTO: 2.23 X10(3) UL (ref 1–4)
LYMPHOCYTES NFR BLD AUTO: 32.5 %
MCH RBC QN AUTO: 27.6 PG (ref 26–34)
MCHC RBC AUTO-ENTMCNC: 32.5 G/DL (ref 31–37)
MCV RBC AUTO: 84.9 FL (ref 80–100)
MONOCYTES # BLD AUTO: 0.78 X10(3) UL (ref 0.1–1)
MONOCYTES NFR BLD AUTO: 11.4 %
NEUTROPHILS # BLD AUTO: 3.63 X10 (3) UL (ref 1.5–7.7)
NEUTROPHILS # BLD AUTO: 3.63 X10(3) UL (ref 1.5–7.7)
NEUTROPHILS NFR BLD AUTO: 52.8 %
NONHDLC SERPL-MCNC: 97 MG/DL (ref ?–130)
OSMOLALITY SERPL CALC.SUM OF ELEC: 294 MOSM/KG (ref 275–295)
PLATELET # BLD AUTO: 239 10(3)UL (ref 150–450)
POTASSIUM SERPL-SCNC: 4.1 MMOL/L (ref 3.5–5.1)
PROT SERPL-MCNC: 7.7 G/DL (ref 5.7–8.2)
RBC # BLD AUTO: 5.04 X10(6)UL (ref 4.3–5.7)
SODIUM SERPL-SCNC: 142 MMOL/L (ref 136–145)
T4 FREE SERPL-MCNC: 1.1 NG/DL (ref 0.8–1.7)
TRIGL SERPL-MCNC: 158 MG/DL (ref 30–149)
TSI SER-ACNC: 1.39 UIU/ML (ref 0.55–4.78)
VIT D+METAB SERPL-MCNC: 51.4 NG/ML (ref 30–100)
VLDLC SERPL CALC-MCNC: 24 MG/DL (ref 0–30)
WBC # BLD AUTO: 6.9 X10(3) UL (ref 4–11)

## 2025-04-21 PROCEDURE — 82306 VITAMIN D 25 HYDROXY: CPT

## 2025-04-21 PROCEDURE — 82105 ALPHA-FETOPROTEIN SERUM: CPT

## 2025-04-21 PROCEDURE — 84439 ASSAY OF FREE THYROXINE: CPT

## 2025-04-21 PROCEDURE — 86803 HEPATITIS C AB TEST: CPT

## 2025-04-21 PROCEDURE — 82390 ASSAY OF CERULOPLASMIN: CPT

## 2025-04-21 PROCEDURE — 83540 ASSAY OF IRON: CPT

## 2025-04-21 PROCEDURE — 84443 ASSAY THYROID STIM HORMONE: CPT

## 2025-04-21 PROCEDURE — 80061 LIPID PANEL: CPT

## 2025-04-21 PROCEDURE — 36415 COLL VENOUS BLD VENIPUNCTURE: CPT

## 2025-04-21 PROCEDURE — 99396 PREV VISIT EST AGE 40-64: CPT | Performed by: FAMILY MEDICINE

## 2025-04-21 PROCEDURE — 87340 HEPATITIS B SURFACE AG IA: CPT

## 2025-04-21 PROCEDURE — 80053 COMPREHEN METABOLIC PANEL: CPT

## 2025-04-21 PROCEDURE — 83516 IMMUNOASSAY NONANTIBODY: CPT

## 2025-04-21 PROCEDURE — 82728 ASSAY OF FERRITIN: CPT

## 2025-04-21 PROCEDURE — 83550 IRON BINDING TEST: CPT

## 2025-04-21 PROCEDURE — 85025 COMPLETE CBC W/AUTO DIFF WBC: CPT

## 2025-04-21 PROCEDURE — 86709 HEPATITIS A IGM ANTIBODY: CPT

## 2025-04-21 NOTE — PROGRESS NOTES
Wellness Exam    REASON FOR VISIT:    Bridger Naidu is a 49 year old male who presents for an Annual Health Assessment.    Current Complaints: Mr. Naidu is here for his wellness exam  Flu Shot: see immunization record  Health Maintenance Topics with due status: Overdue       Topic Date Due    DTaP,Tdap,and Td Vaccines 10/30/2022    Annual Physical 08/03/2024    COVID-19 Vaccine Never done    PSA Never done    Pneumococcal Vaccine: 50+ Years Never done    Zoster Vaccines Never done    Annual Depression Screening 01/01/2025     Reported Health:   He is a very pleasant 49-year-old gentleman with history of dyslipidemia and vitamin D deficiency presenting for his wellness exam.  He has been doing well and does not have any questions or concerns when he has been exercising more by doing more strength training.    I had reviewed past medical and family histories together with allergy and medication lists documented.    Details about the complaints:  N/A    General Health     How would you describe your current health state?: Good    Type of Diet: Balanced    How do you maintain positive mental well-being?: Social Interaction    How would you describe your daily physical activity?: Moderate    If you are a male age 45-79 or a female age 55-79, do you take aspirin?: No    Have you had any immunizations at another office such as Influenza, Hepatitis B, Tetanus, or Pneumococcal?: No    At any time do you feel concerned for the safety/well-being of yourself and/or your children, in your home or elsewhere?: No     CAGE:     Cut: Have you ever felt you should Cut down on your drinking?: No    Annoyed: Have people Annoyed you by criticizing your drinking?: No    Guilty: Have you ever felt bad or Guilty about your drinking?: No    Eye Opener: Have you ever had a drink first thing in the morning to steady your nerves or to get rid of a hangover (Eye opener)?: No    Scoring  Total Score: 0     PHQ-4: Over the LAST 2 WEEKS        Depression Screening (PHQ-2/PHQ-9): Over the LAST 2 WEEKS   Little interest or pleasure in doing things (over the last two weeks)?: Not at all    Feeling down, depressed, or hopeless (over the last two weeks)?: Not at all    PHQ-2 SCORE: 0              PREVENTATIVE SERVICES  INDICATIONS AND SCHEDULE Recommendation Internal Lab or Procedure External Lab or Procedure   Colonoscopy Screen Every 10 years Health Maintenance   Topic Date Due    Colorectal Cancer Screening  11/04/2034       Flex Sigmoidoscopy Screen  Every 5 years No results found for this or any previous visit.    Fecal Occult Blood  Annually No results found for: \"FOB\", \"OCCULTSTOOL\"    Obesity Screening Screen all adults annually Body mass index is 30.99 kg/m².      Preventive Services for Which Recommendations Vary with Risk Recommendation Internal Lab or Procedure External Lab or Procedure   Cholesterol Screening Recommended screening varies with age, risk and gender LDL Cholesterol (mg/dL)   Date Value   08/01/2023 55     LDL-CHOLESTEROL (mg/dL (calc))   Date Value   07/13/2022 61       Diabetes Screening  If history of high blood pressure or other  risk factors No results found for: \"A1C\"  Glucose (mg/dL)   Date Value   08/01/2023 95     GLUCOSE (mg/dL)   Date Value   07/13/2022 86         Gonorrhea Screening If high risk No results found for: \"GONOCOCCUS\"    HIV Screening For all adults age 18-65, older adults at increased risk No results found for: \"HIV\"    Syphilis Screening Screen if pregnant or high risk No results found for: \"RPR\"    Hepatitis C Screening Screen those at high risk plus screen one time for adults born 1946-1 965 Hcv Ab (no units)   Date Value   11/17/2014 Nonreactive       Tuberculosis Screen If high risk No components found for: \"PPDINDURAT\"      ALLERGIES:   Allergies[1]    CURRENT MEDICATIONS:   Current Medications[2]   MEDICAL INFORMATION:   Past Medical History[3]   Past Surgical History[4]   Family History[5]   SOCIAL  HISTORY:   Short Social Hx on File[6]       REVIEW OF SYSTEMS:   Constitutional: Negative for fever, chills and fatigue.   HENT: Negative for hearing loss, congestion, sore throat and neck pain.    Eyes: Negative for pain and visual disturbance.   Respiratory: Negative for cough and shortness of breath.    Cardiovascular: Negative for chest pain and palpitations.   Gastrointestinal: Negative for nausea, vomiting, abdominal pain and diarrhea.   Genitourinary: Negative for urgency, frequency and difficulty urinating.   Musculoskeletal: Negative for arthralgias and no gait problem.   Skin: Negative for color change and rash.   Neurological: Negative for tremors, weakness and numbness.   Hematological: Negative for adenopathy. Does not bruise/bleed easily.   Psychiatric/Behavioral: Negative for confusion and agitation. The patient is not nervous/anxious.      EXAM:   /70   Pulse 72   Temp 98 °F (36.7 °C) (Temporal)   Resp 16   Ht 5' 10\" (1.778 m)   Wt 216 lb (98 kg)   SpO2 98%   BMI 30.99 kg/m²   Constitutional: He appears well-developed, nourished, and his stated age. Vital signs reviewed  Pleasant man   Head: Normocephalic and atraumatic.   Ear: TMs visible and normal bilaterally  Nose: Nose normal.   Eyes: EOM are normal. Pupils are equal, round, and reactive to light. No scleral icterus.   Neck: Normal range of motion. No thyromegaly present.   Cardiovascular: Normal rate, regular rhythm and normal heart sounds.  Exam reveals no friction rub.    No murmur heard.  Pulmonary/Chest: Effort normal and breath sounds normal. He has no wheezes. He has no rales.   Abdominal: Soft. Bowel sounds are normal. There is no tenderness.   Musculoskeletal: Normal range of motion. He exhibits no edema.   Lymphadenopathy:     He has no cervical adenopathy.   Neurological: He is alert and oriented to person, place, and time. He has normal reflexes.   Skin: Skin is warm. No rash noted. No erythema. with normal  hair  Psychiatric: He has a normal mood and affect. His behavior is normal.     ASSESSMENT AND OTHER RELEVANT CHRONIC CONDITIONS:   Bridger Naidu is a 49 year old male who presents for an Annual Health Assessment.   1. Wellness examination    2. Screening for malignant neoplasm of prostate    3. Vitamin D deficiency        PLAN SUMMARY:   Bridger Naidu is a 49 year old male  Age appropriate cancer screening, labs, safety, immunizations were discussed with the patient and ordered as follows:  Diagnoses and all orders for this visit:    Wellness examination  -     CBC With Differential With Platelet; Future  -     Comp Metabolic Panel (14); Future  -     TSH and Free T4; Future  -     Lipid Panel; Future    Screening for malignant neoplasm of prostate  -     PSA Total, Screen; Future    Vitamin D deficiency  -     Vitamin D [E]; Future    Well adult 49-year-old gentleman.  Encouraged him to do a mix of cardio and strength exercises.  We shall check routine labs will notify him once we get test results and will provide with recommendations if necessary.  Follow-up in 1 year as needed    This note was prepared using Dragon Medical voice recognition dictation software. As a result errors may occur. When identified these errors have been corrected. While every attempt is made to correct errors during dictation discrepancies may still exist            Orders Placed This Encounter   Procedures    CBC With Differential With Platelet    Comp Metabolic Panel (14)    TSH and Free T4    PSA Total, Screen    Lipid Panel    Vitamin D [E]       Imaging & Consults:  None    His 5 year prevention plan includes: annual visits for fasting labs  Health Maintenance   Topic Date Due    DTaP,Tdap,and Td Vaccines (2 - Td or Tdap) 10/30/2022    Annual Physical  08/03/2024    COVID-19 Vaccine (1 - 2024-25 season) Never done    PSA  Never done    Pneumococcal Vaccine: 50+ Years (1 of 1 - PCV) Never done    Zoster Vaccines (1 of 2) Never  done    Annual Depression Screening  01/01/2025    Influenza Vaccine (Season Ended) 10/01/2025    Colorectal Cancer Screening  11/04/2034    Meningococcal B Vaccine  Aged Out     Patient/Caregiver Education:  Patient/Caregiver Education: There are no barriers to learning. Medical education done.  Outcome: Patient verbalizes understanding.    Educated by: MD   The patient indicates understanding of these issues and agrees to the plan.    SUGGESTED VACCINATIONS - Influenza, Pneumococcal, Zoster, Tetanus     Immunization History   Administered Date(s) Administered    FLULAVAL 6 months & older 0.5 ml Prefilled syringe (85788) 11/09/2019    FLUZONE 6 months and older PFS 0.5 ml (55420) 11/09/2019    TDAP 10/30/2012       Influenza Annually   Pneumococcal if high risk   Td/Tdap once then every 10 years   HPV Males 11-21   Zoster (Shingles) 60 and older: one dose   Varicella 2 doses if not immune   MMR 1-2 doses if born after 1957 and not immune     Problem List[7]  PREVENTIVE VISIT,EST,40-64         [1]   Allergies  Allergen Reactions    Carrot OTHER (SEE COMMENTS)     raw   [2]   Current Outpatient Medications   Medication Sig Dispense Refill    ROSUVASTATIN 10 MG Oral Tab TAKE 1 TABLET BY MOUTH EVERY DAY AT NIGHT 90 tablet 1    aspirin 81 MG Oral Tab Take 1 tablet (81 mg total) by mouth daily.      Cholecalciferol (VITAMIN D3) 1000 UNITS Oral Cap Take 1 tablet by mouth daily.      Multiple Vitamin (MULTI-VITAMIN) Oral Tab Take 1 tablet by mouth daily.     [3]   Past Medical History:   Abdominal distention    Abdominal pain    Back pain    Threw back out. Occassional lower left pain.    Bloating    Calculus of kidney    Constipation    Decorative tattoo    Diarrhea    Diarrhea, unspecified    Fatigue    Hemorrhoids    High cholesterol    Has been brought under control    Irregular bowel habits    Low back sprain    Other and unspecified hyperlipidemia    URI (upper respiratory infection)    Wears glasses   [4]   Past  Surgical History:  Procedure Laterality Date    Colonoscopy  2009    Oral surgery procedure      wisdom teeth   [5]   Family History  Problem Relation Age of Onset    Hypertension Father     Colon Polyps Father    [6]   Social History  Socioeconomic History    Marital status:    Tobacco Use    Smoking status: Never    Smokeless tobacco: Never   Vaping Use    Vaping status: Never Used   Substance and Sexual Activity    Alcohol use: Yes     Comment: Socially    Drug use: No   Other Topics Concern    Caffeine Concern Yes     Comment: 2 daily    Exercise Yes     Comment: daily   [7]   Patient Active Problem List  Diagnosis    Hyperlipidemia, mixed    Vitamin D deficiency    Abnormal EKG    Elevated LFTs    Enlarged thyroid    Benign neoplasm of ascending colon    Erectile dysfunction    Deviated septum    Allergic rhinitis    Elevated coronary artery calcium score    Personal history of colonic polyps    Personal history of hyperplastic colon polyps

## 2025-04-21 NOTE — PATIENT INSTRUCTIONS
Thank you for choosing Hugo Cohen MD at [facility]  To Do: Bridger MOYER Elysia  1. Please see age appropriate health prevention below     Call [phone] to schedule the appointment.   Please signup for [employer], which is electronic access to your record if you have not done so.  All your results will post on there.  [email or web address]   You can NOW use Opendisc to book your appointments with us, or consider using open access scheduling which is through the [place] website [email or web address] and type in Hugo Cohen MD and follow the links for \"Schedule Online Now\"    To schedule Imaging or tests at [facility] call Central Scheduling [phone], Go to [facility] A ER Building (For example: CT scans, X rays, Ultrasound, MRI)  Cardiac Testing in ER building Building A second floor Cardiac Testing [phone] (For example: Holter Monitor, Cardiac Stress tests,Event Monitor, or 2D Echocardiograms)  [facility] call [phone] usually in [facility] A  Walk in Clinic in [place] at [address] Mon-Fri at 8am-7:30 p.m., and Sat/Sun 9:00a.m.-4:30 p.m.  Also at [address] [place] [address]  Call [phone] for info     Please call our office about any questions regarding your treatment/medicines/tests as a result of today's visit.  For your safety, read the entire package insert of all medicines prescribed to you and be aware of all of the risks of treatment even beyond those discussed today.  All therapies have potential risk of harm or side effects or medication interactions.  It is your duty and for your safety to discuss with the pharmacist and our office with questions, and to notify us and stop treatment if problems arise, but know that our intention is that the benefits outweigh those potential risks and we strive to make you healthier and to improve your quality of life.    Referrals, and Radiology Information:    If your insurance requires a referral to a specialist, please  allow 5 business days to process your referral request.    If Hugo Cohen MD orders a CT or MRI, it may take up to 10 business days to receive approval from your insurance company. Once our office has called informing you that the insurance company approved your testing, please call Central Scheduling at [phone]  Please allow our office 5 business days to contact you regarding any testing results.    Refill policies:   Allow 3 business days for refills; controlled substances may take longer and must be picked up from the office in person.  Narcotic medications can only be filled in 30 day increments and must be refilled at an office visit only.  If your prescription is due for a refill, you may be due for a follow-up appointment.  We cannot refill your maintenance medications at a preventative wellness visit.  To best provide you care, patients receiving maintenance medications need to be seen at least twice a year.

## 2025-04-22 DIAGNOSIS — R74.8 ELEVATED LIVER ENZYMES: Primary | ICD-10-CM

## 2025-04-22 LAB
AFP-TM SERPL-MCNC: <2.2 NG/ML (ref ?–8)
CERULOPLASMIN SERPL-MCNC: 22 MG/DL (ref 20–60)
DEPRECATED HBV CORE AB SER IA-ACNC: 63 NG/ML (ref 50–336)
HAV IGM SER QL: NONREACTIVE
HBV SURFACE AG SER-ACNC: 0.12 [IU]/L
HBV SURFACE AG SERPL QL IA: NONREACTIVE
HCV AB SERPL QL IA: NONREACTIVE
IRON SATN MFR SERPL: 29 % (ref 20–50)
IRON SERPL-MCNC: 92 UG/DL (ref 65–175)
TOTAL IRON BINDING CAPACITY: 312 UG/DL (ref 250–425)
TRANSFERRIN SERPL-MCNC: 239 MG/DL (ref 215–365)

## 2025-04-22 NOTE — TELEPHONE ENCOUNTER
See result review fro labs from 4/21/2025.  Written by Hugo Cohen MD on 4/22/2025  7:05 AM CDT  Seen by patient Bridger Naidu on 4/22/2025  7:07 AM

## 2025-04-23 ENCOUNTER — LAB ENCOUNTER (OUTPATIENT)
Dept: LAB | Age: 50
End: 2025-04-23
Attending: FAMILY MEDICINE
Payer: COMMERCIAL

## 2025-04-23 DIAGNOSIS — R74.8 ELEVATED LIVER ENZYMES: ICD-10-CM

## 2025-04-23 LAB
ACTIN SMOOTH MUSCLE AB: 6 UNITS
HAV AB SER QL IA: REACTIVE
HAV IGM SER QL: NONREACTIVE

## 2025-04-23 PROCEDURE — 86708 HEPATITIS A ANTIBODY: CPT

## 2025-04-23 PROCEDURE — 86709 HEPATITIS A IGM ANTIBODY: CPT

## 2025-04-23 PROCEDURE — 36415 COLL VENOUS BLD VENIPUNCTURE: CPT

## 2025-04-23 PROCEDURE — 83516 IMMUNOASSAY NONANTIBODY: CPT

## 2025-04-24 LAB — M2 MITOCHONDRIAL AB: <20 UNITS

## 2025-04-29 ENCOUNTER — TELEMEDICINE (OUTPATIENT)
Dept: FAMILY MEDICINE CLINIC | Facility: CLINIC | Age: 50
End: 2025-04-29
Payer: COMMERCIAL

## 2025-04-29 DIAGNOSIS — R79.89 ELEVATED LFTS: Primary | ICD-10-CM

## 2025-04-29 PROCEDURE — 98005 SYNCH AUDIO-VIDEO EST LOW 20: CPT | Performed by: FAMILY MEDICINE

## 2025-04-29 NOTE — PROGRESS NOTES
Subjective:   Patient ID: Bridger Naidu is a 49 year old male.    HPI   is a very pleasant 49-year-old gentleman with history of dyslipidemia and vitamin D deficiency presenting today for video visit follow-up after he had labs done.  Liver enzymes were noted to be elevated.  This has been elevated in the past but had improved.  He was advised to him to make lifestyle changes.  We did proceed with doing transaminitis panel which showed nonreactive hep B surface antigen level.  Total hip a antibody was reactive and hep a IgM antibody was nonreactive.  He feels well otherwise.      I had reviewed past medical and family histories together with allergy and medication lists documented.    History/Other:   Review of Systems   Constitutional:  Negative for fatigue and fever.   Gastrointestinal:  Negative for abdominal pain, diarrhea, nausea and vomiting.     Current Medications[1]  Allergies:Allergies[2]    Objective:   Physical Exam  Constitutional:       General: He is not in acute distress.  Neurological:      Mental Status: He is alert.         Assessment & Plan:   1. Elevated LFTs    Explained to him what those levels and values mean  - I assured him that he does not have any active hep A or hep B infections based on the test that he had.  Liver enzymes are elevated likely due to fatty liver.  He will have liver ultrasound soon and will await results  - He does not recall having had a infection when he was younger or hep a vaccine.      This note was prepared using Dragon Medical voice recognition dictation software. As a result errors may occur. When identified these errors have been corrected. While every attempt is made to correct errors during dictation discrepancies may still exist            No orders of the defined types were placed in this encounter.      Meds This Visit:  Requested Prescriptions      No prescriptions requested or ordered in this encounter       Imaging & Referrals:  None          [1]   Current Outpatient Medications   Medication Sig Dispense Refill    ROSUVASTATIN 10 MG Oral Tab TAKE 1 TABLET BY MOUTH EVERY DAY AT NIGHT 90 tablet 1    aspirin 81 MG Oral Tab Take 1 tablet (81 mg total) by mouth daily.      Cholecalciferol (VITAMIN D3) 1000 UNITS Oral Cap Take 1 tablet by mouth daily.      Multiple Vitamin (MULTI-VITAMIN) Oral Tab Take 1 tablet by mouth daily.     [2]   Allergies  Allergen Reactions    Carrot OTHER (SEE COMMENTS)     raw

## 2025-05-19 ENCOUNTER — HOSPITAL ENCOUNTER (OUTPATIENT)
Dept: ULTRASOUND IMAGING | Age: 50
Discharge: HOME | End: 2025-05-19
Attending: FAMILY MEDICINE
Payer: COMMERCIAL

## 2025-05-19 DIAGNOSIS — R74.8 ELEVATED LIVER ENZYMES: ICD-10-CM

## 2025-05-19 PROCEDURE — 76700 US EXAM ABDOM COMPLETE: CPT | Performed by: FAMILY MEDICINE

## 2025-08-15 RX ORDER — ROSUVASTATIN CALCIUM 10 MG/1
10 TABLET, COATED ORAL NIGHTLY
Qty: 90 TABLET | Refills: 1 | Status: SHIPPED | OUTPATIENT
Start: 2025-08-15

## (undated) NOTE — MR AVS SNAPSHOT
[facility] [address]  [facility]   [phone]               Thank you for choosing us for your health care visit with OSIRIS Nolan.   We are glad to serve you and happy to provide you at [phone]. Thursday April 13, 2017     Imaging:  US ABDOMEN COMPLETE (CPT=76700)    Instructions: To schedule an appointment for your radiology test please call [facility] Scheduling at [phone].          Referral Details     R 2. Referral for Jessie  3. Pt to follow-up in 1 month, sooner if problems    • Please signup for MY CHART, which is electronic access to your record if you have not done so.  All your results will post on there.  [email or web address]  • You can NOW us If your insurance requires a referral to a specialist, please allow 5 business days to process your referral request.    If Tamir Egan PA-C orders a CT or MRI, it may take up to 10 business days to receive approval from your insurance company.  Once Phone:  [phone]    - Ipratropium Bromide 0.03 % Soln            MyChart     Visit MyChart  You can access your MyChart to more actively manage your health care and view more details from this visit by going to [email or web address]   If you'v

## (undated) NOTE — MR AVS SNAPSHOT
[facility] [address]  [place]  [phone]               Thank you for choosing us for your health care visit with Latasha Balbuena PA-C.   We are glad to serve you and happy to provide you [email or web address]  • You can NOW use the [employer] to book your appointments with us, or consider to use open access scheduling which is through the [employer] website [email or web address] and type in Phuc Taylor and follow the links for \" business days to receive approval from your insurance company.  Once our office has called informing you that the insurance company approved your testing, please call Central Scheduling at [phone]  Please allow our office 5 business days to contact you discharge instructions in [employer] by going to Visits < Admission Summaries. If you've been to the Emergency Department or your doctor's office, you can view your past visit information in [employer] by going to Visits < Visit Summaries. [employer] questions?